# Patient Record
Sex: FEMALE | Race: WHITE | NOT HISPANIC OR LATINO | Employment: FULL TIME | URBAN - NONMETROPOLITAN AREA
[De-identification: names, ages, dates, MRNs, and addresses within clinical notes are randomized per-mention and may not be internally consistent; named-entity substitution may affect disease eponyms.]

---

## 2023-05-17 ENCOUNTER — OFFICE VISIT (OUTPATIENT)
Dept: PRIMARY CARE | Facility: CLINIC | Age: 28
End: 2023-05-17
Payer: COMMERCIAL

## 2023-05-17 VITALS
TEMPERATURE: 98.6 F | HEIGHT: 63 IN | DIASTOLIC BLOOD PRESSURE: 80 MMHG | OXYGEN SATURATION: 98 % | HEART RATE: 115 BPM | SYSTOLIC BLOOD PRESSURE: 126 MMHG | WEIGHT: 274.2 LBS | BODY MASS INDEX: 48.58 KG/M2

## 2023-05-17 DIAGNOSIS — E22.1 HYPERPROLACTINEMIA (MULTI): ICD-10-CM

## 2023-05-17 DIAGNOSIS — K21.9 GASTROESOPHAGEAL REFLUX DISEASE, UNSPECIFIED WHETHER ESOPHAGITIS PRESENT: ICD-10-CM

## 2023-05-17 DIAGNOSIS — J30.89 ENVIRONMENTAL AND SEASONAL ALLERGIES: ICD-10-CM

## 2023-05-17 DIAGNOSIS — E03.9 HYPOTHYROIDISM, UNSPECIFIED TYPE: ICD-10-CM

## 2023-05-17 DIAGNOSIS — F41.1 GENERALIZED ANXIETY DISORDER: ICD-10-CM

## 2023-05-17 DIAGNOSIS — E78.5 BORDERLINE HYPERLIPIDEMIA: ICD-10-CM

## 2023-05-17 DIAGNOSIS — F33.9 RECURRENT MAJOR DEPRESSIVE DISORDER, REMISSION STATUS UNSPECIFIED (CMS-HCC): ICD-10-CM

## 2023-05-17 DIAGNOSIS — R53.81 CHRONIC FATIGUE AND MALAISE: Primary | ICD-10-CM

## 2023-05-17 DIAGNOSIS — R53.82 CHRONIC FATIGUE AND MALAISE: Primary | ICD-10-CM

## 2023-05-17 DIAGNOSIS — E28.2 PCOS (POLYCYSTIC OVARIAN SYNDROME): ICD-10-CM

## 2023-05-17 DIAGNOSIS — D51.0 PERNICIOUS ANEMIA: ICD-10-CM

## 2023-05-17 PROBLEM — E04.9 GOITER: Status: ACTIVE | Noted: 2023-05-17

## 2023-05-17 PROBLEM — R71.8 ELEVATED RED BLOOD CELL COUNT: Status: RESOLVED | Noted: 2023-05-17 | Resolved: 2023-05-17

## 2023-05-17 PROBLEM — A74.9 CHLAMYDIAL INFECTION: Status: RESOLVED | Noted: 2019-02-18 | Resolved: 2023-05-17

## 2023-05-17 PROBLEM — E66.01 OBESITY, CLASS III, BMI 40-49.9 (MORBID OBESITY) (MULTI): Status: ACTIVE | Noted: 2019-04-08

## 2023-05-17 PROBLEM — F32.A ANXIETY AND DEPRESSION: Status: ACTIVE | Noted: 2023-05-17

## 2023-05-17 PROBLEM — R06.83 SNORING: Status: RESOLVED | Noted: 2023-05-17 | Resolved: 2023-05-17

## 2023-05-17 PROBLEM — E55.9 VITAMIN D DEFICIENCY: Status: ACTIVE | Noted: 2023-05-17

## 2023-05-17 PROBLEM — N91.5 OLIGOMENORRHEA: Status: RESOLVED | Noted: 2019-02-15 | Resolved: 2023-05-17

## 2023-05-17 PROBLEM — A59.9 TRICHOMONAS INFECTION: Status: RESOLVED | Noted: 2023-05-17 | Resolved: 2023-05-17

## 2023-05-17 PROBLEM — E66.813 OBESITY, CLASS III, BMI 40-49.9 (MORBID OBESITY): Status: ACTIVE | Noted: 2019-04-08

## 2023-05-17 PROBLEM — R06.83 SNORING: Status: ACTIVE | Noted: 2023-05-17

## 2023-05-17 PROBLEM — G47.00 INSOMNIA: Status: ACTIVE | Noted: 2023-05-17

## 2023-05-17 PROBLEM — F41.9 ANXIETY AND DEPRESSION: Status: ACTIVE | Noted: 2023-05-17

## 2023-05-17 PROBLEM — N20.0 KIDNEY STONES: Status: RESOLVED | Noted: 2023-05-17 | Resolved: 2023-05-17

## 2023-05-17 PROCEDURE — 1036F TOBACCO NON-USER: CPT | Performed by: PHYSICIAN ASSISTANT

## 2023-05-17 PROCEDURE — 99214 OFFICE O/P EST MOD 30 MIN: CPT | Performed by: PHYSICIAN ASSISTANT

## 2023-05-17 RX ORDER — SERTRALINE HYDROCHLORIDE 25 MG/1
25 TABLET, FILM COATED ORAL DAILY
Qty: 90 TABLET | Refills: 0 | Status: SHIPPED | OUTPATIENT
Start: 2023-05-17 | End: 2023-06-20 | Stop reason: SDUPTHER

## 2023-05-17 RX ORDER — METFORMIN HYDROCHLORIDE 500 MG/1
500 TABLET ORAL
Qty: 180 TABLET | Refills: 3 | Status: SHIPPED | OUTPATIENT
Start: 2023-05-17 | End: 2023-06-20 | Stop reason: SDUPTHER

## 2023-05-17 RX ORDER — CETIRIZINE HYDROCHLORIDE 10 MG/1
10 TABLET ORAL DAILY
Qty: 90 TABLET | Refills: 3 | Status: SHIPPED | OUTPATIENT
Start: 2023-05-17 | End: 2024-05-16

## 2023-05-17 RX ORDER — OMEPRAZOLE 40 MG/1
40 CAPSULE, DELAYED RELEASE ORAL DAILY
Qty: 90 CAPSULE | Refills: 3 | Status: SHIPPED | OUTPATIENT
Start: 2023-05-17 | End: 2023-12-20 | Stop reason: WASHOUT

## 2023-05-17 ASSESSMENT — PATIENT HEALTH QUESTIONNAIRE - PHQ9
2. FEELING DOWN, DEPRESSED OR HOPELESS: NOT AT ALL
SUM OF ALL RESPONSES TO PHQ9 QUESTIONS 1 AND 2: 0
1. LITTLE INTEREST OR PLEASURE IN DOING THINGS: NOT AT ALL

## 2023-05-17 NOTE — PROGRESS NOTES
Subjective   Patient ID: Heydi Tineo is a 27 y.o. female who presents for New Patient Visit (Establish care,. Would like updated blood work. ).    HPI Heydi Tineo is a 27 y.o. year old female patient with presenting to clinic with concern for   Chief Complaint   Patient presents with    New Patient Visit     Establish care,. Would like updated blood work.        Sister with Lupus. Chronic malaise and fatigue.   PCOS. Difficulty with weight loss.   GERD  Sees Gyn marvintamaylin        Past Medical History:   Diagnosis Date    Other specified health status     Known health problems: none      Past Surgical History:   Procedure Laterality Date    OTHER SURGICAL HISTORY  01/28/2019    Tonsillectomy with adenoidectomy    OTHER SURGICAL HISTORY  01/28/2019    Ear pressure equalization tube insertion      No family history on file.   Social History     Tobacco Use    Smoking status: Never    Smokeless tobacco: Never   Vaping Use    Vaping status: Not on file   Substance Use Topics    Alcohol use: Never            Current Outpatient Medications:     omeprazole (PriLOSEC) 40 mg DR capsule, Take 1 capsule by mouth once daily, Disp: 30 capsule, Rfl: 0        Review of Systems  Review of Systems:   Constitutional: Denies fever  HEENT: Denies ST, earache  CVS: Denies Chest pain  Pulmonary: Denies wheezing, SOB  GI: Denies N/V  : Denies dysuria  Musculoskeletal:  Denies myalgia  Neuro: Denies focal weakness or numbness.  Skin: Denies Rashes.  *Review of Systems is negative unless otherwise mentioned in HPI or ROS above.      Objective   /80   Pulse (!) 115   Temp 37 °C (98.6 °F)   Wt 124 kg (274 lb 3.2 oz)   SpO2 98%   BMI 48.57 kg/m²     Physical Exam  Physical exam:  /80   Pulse (!) 115   Temp 37 °C (98.6 °F)   Wt 124 kg (274 lb 3.2 oz)   SpO2 98%   BMI 48.57 kg/m²  reviewed Body mass index is 48.57 kg/m².   Constitutional: NAD. Afebrile. Resting comfortably.  ENT: Nasal mucosa and oropharynx: moist oral  mucosa. Posterior oropharynx nonerythematous. No posterior pharyngeal streaking.  TM: Bilat TM nonerythematous, pearly grey, landmarks intact. EAC wnl bilat.  Eyes: PERRLA. EOM intact.   Lymph: No anterior cervical chain or submandibular lymphadenopathy. No sentinel lymph nodes.  Cardiac: Regular rate at 90 & reg rhythm. No murmur, gallops, or rubs.  Pulmonary: Lungs clear to auscultation bilaterally with good aeration. No wheezes, rhonchi, or rales.   GI: Soft, Nontender, nondistended. No guarding. Normal BS x4.  : No suprapubic tenderness. No CVA tenderness to percussion.   Musculoskeletal: No peripheral edema.   Skin: No evidence of trauma. No rashes  Neuro: No focal neuro deficits. Normal gait without assistive devices.  Psych: Intact judgement and insight.    Assessment/Plan   Problem List Items Addressed This Visit       Hyperprolactinemia (CMS/[facility])    Relevant Orders    Referral to Endocrinology    Prolactin level    PCOS (polycystic ovarian syndrome)    Relevant Medications    metFORMIN (Glucophage) 500 mg tablet    Other Relevant Orders    Hemoglobin A1C    Referral to Endocrinology    Pernicious anemia    Relevant Orders    CBC    Vitamin B12    Folate     Other Visit Diagnoses       Chronic fatigue and malaise    -  Primary    Relevant Orders    LISA + MAURI Panel    Citrulline Antibody, IgG    C-Reactive Protein    Generalized anxiety disorder        Relevant Medications    sertraline (Zoloft) 25 mg tablet    Other Relevant Orders    CBC    Comprehensive Metabolic Panel    Recurrent major depressive disorder, remission status unspecified (CMS/[facility])        Relevant Medications    sertraline (Zoloft) 25 mg tablet    Other Relevant Orders    CBC    Comprehensive Metabolic Panel    Hypothyroidism, unspecified type        Relevant Orders    US thyroid    TSH    T4, free    T3, free    Referral to Endocrinology    Borderline hyperlipidemia        Relevant Orders    Lipid Panel    TSH    Gastroesophageal reflux  disease, unspecified whether esophagitis present        Relevant Medications    omeprazole (PriLOSEC) 40 mg DR capsule    Environmental and seasonal allergies        Relevant Medications    cetirizine (ZyrTEC) 10 mg tablet

## 2023-05-19 ENCOUNTER — LAB (OUTPATIENT)
Dept: LAB | Facility: LAB | Age: 28
End: 2023-05-19
Payer: COMMERCIAL

## 2023-05-19 DIAGNOSIS — R53.82 CHRONIC FATIGUE AND MALAISE: ICD-10-CM

## 2023-05-19 DIAGNOSIS — E03.9 HYPOTHYROIDISM, UNSPECIFIED TYPE: ICD-10-CM

## 2023-05-19 DIAGNOSIS — F33.9 RECURRENT MAJOR DEPRESSIVE DISORDER, REMISSION STATUS UNSPECIFIED (CMS-HCC): ICD-10-CM

## 2023-05-19 DIAGNOSIS — R53.81 CHRONIC FATIGUE AND MALAISE: ICD-10-CM

## 2023-05-19 DIAGNOSIS — E28.2 PCOS (POLYCYSTIC OVARIAN SYNDROME): ICD-10-CM

## 2023-05-19 DIAGNOSIS — D51.0 PERNICIOUS ANEMIA: ICD-10-CM

## 2023-05-19 DIAGNOSIS — F41.1 GENERALIZED ANXIETY DISORDER: ICD-10-CM

## 2023-05-19 DIAGNOSIS — E78.5 BORDERLINE HYPERLIPIDEMIA: ICD-10-CM

## 2023-05-19 LAB
ALANINE AMINOTRANSFERASE (SGPT) (U/L) IN SER/PLAS: 17 U/L (ref 7–45)
ALBUMIN (G/DL) IN SER/PLAS: 4.4 G/DL (ref 3.4–5)
ALKALINE PHOSPHATASE (U/L) IN SER/PLAS: 72 U/L (ref 33–110)
ANION GAP IN SER/PLAS: 16 MMOL/L (ref 10–20)
ANTI-CENTROMERE: <0.2 AI
ANTI-CHROMATIN: <0.2 AI
ANTI-DNA (DS): 2 IU/ML
ANTI-JO-1 IGG: <0.2 AI
ANTI-RIBOSOMAL P: <0.2 AI
ANTI-RNP: <0.2 AI
ANTI-SCL-70: <0.2 AI
ANTI-SM/RNP: <0.2 AI
ANTI-SM: <0.2 AI
ANTI-SSA: <0.2 AI
ANTI-SSB: <0.2 AI
ASPARTATE AMINOTRANSFERASE (SGOT) (U/L) IN SER/PLAS: 20 U/L (ref 9–39)
BILIRUBIN TOTAL (MG/DL) IN SER/PLAS: 0.6 MG/DL (ref 0–1.2)
CALCIUM (MG/DL) IN SER/PLAS: 10 MG/DL (ref 8.6–10.3)
CARBON DIOXIDE, TOTAL (MMOL/L) IN SER/PLAS: 26 MMOL/L (ref 21–32)
CHLORIDE (MMOL/L) IN SER/PLAS: 101 MMOL/L (ref 98–107)
CHOLESTEROL (MG/DL) IN SER/PLAS: 166 MG/DL (ref 0–199)
CHOLESTEROL IN HDL (MG/DL) IN SER/PLAS: 49.4 MG/DL
CHOLESTEROL/HDL RATIO: 3.4
CREATININE (MG/DL) IN SER/PLAS: 0.88 MG/DL (ref 0.5–1.05)
ERYTHROCYTE DISTRIBUTION WIDTH (RATIO) BY AUTOMATED COUNT: 12.6 % (ref 11.5–14.5)
ERYTHROCYTE MEAN CORPUSCULAR HEMOGLOBIN CONCENTRATION (G/DL) BY AUTOMATED: 31.4 G/DL (ref 32–36)
ERYTHROCYTE MEAN CORPUSCULAR VOLUME (FL) BY AUTOMATED COUNT: 93 FL (ref 80–100)
ERYTHROCYTES (10*6/UL) IN BLOOD BY AUTOMATED COUNT: 5.12 X10E12/L (ref 4–5.2)
ESTIMATED AVERAGE GLUCOSE FOR HBA1C: 100 MG/DL
GFR FEMALE: >90 ML/MIN/1.73M2
GLUCOSE (MG/DL) IN SER/PLAS: 85 MG/DL (ref 74–99)
HEMATOCRIT (%) IN BLOOD BY AUTOMATED COUNT: 47.4 % (ref 36–46)
HEMOGLOBIN (G/DL) IN BLOOD: 14.9 G/DL (ref 12–16)
HEMOGLOBIN A1C/HEMOGLOBIN TOTAL IN BLOOD: 5.1 %
LDL: 95 MG/DL (ref 0–119)
LEUKOCYTES (10*3/UL) IN BLOOD BY AUTOMATED COUNT: 14.1 X10E9/L (ref 4.4–11.3)
PLATELETS (10*3/UL) IN BLOOD AUTOMATED COUNT: 582 X10E9/L (ref 150–450)
POTASSIUM (MMOL/L) IN SER/PLAS: 4.6 MMOL/L (ref 3.5–5.3)
PROTEIN TOTAL: 7.4 G/DL (ref 6.4–8.2)
SODIUM (MMOL/L) IN SER/PLAS: 138 MMOL/L (ref 136–145)
THYROTROPIN (MIU/L) IN SER/PLAS BY DETECTION LIMIT <= 0.05 MIU/L: 1.29 MIU/L (ref 0.44–3.98)
THYROXINE (T4) FREE (NG/DL) IN SER/PLAS: 1.33 NG/DL (ref 0.61–1.12)
TRIGLYCERIDE (MG/DL) IN SER/PLAS: 106 MG/DL (ref 0–149)
TRIIODOTHYRONINE (T3) FREE (PG/ML) IN SER/PLAS: 4.3 PG/ML (ref 2.3–4.2)
UREA NITROGEN (MG/DL) IN SER/PLAS: 11 MG/DL (ref 6–23)
VLDL: 21 MG/DL (ref 0–40)

## 2023-05-19 PROCEDURE — 80061 LIPID PANEL: CPT

## 2023-05-19 PROCEDURE — 82607 VITAMIN B-12: CPT

## 2023-05-19 PROCEDURE — 84481 FREE ASSAY (FT-3): CPT

## 2023-05-19 PROCEDURE — 83036 HEMOGLOBIN GLYCOSYLATED A1C: CPT

## 2023-05-19 PROCEDURE — 82746 ASSAY OF FOLIC ACID SERUM: CPT

## 2023-05-19 PROCEDURE — 36415 COLL VENOUS BLD VENIPUNCTURE: CPT

## 2023-05-19 PROCEDURE — 84443 ASSAY THYROID STIM HORMONE: CPT

## 2023-05-19 PROCEDURE — 84439 ASSAY OF FREE THYROXINE: CPT

## 2023-05-19 PROCEDURE — 86038 ANTINUCLEAR ANTIBODIES: CPT

## 2023-05-19 PROCEDURE — 80053 COMPREHEN METABOLIC PANEL: CPT

## 2023-05-19 PROCEDURE — 86235 NUCLEAR ANTIGEN ANTIBODY: CPT

## 2023-05-19 PROCEDURE — 85027 COMPLETE CBC AUTOMATED: CPT

## 2023-05-19 PROCEDURE — 86225 DNA ANTIBODY NATIVE: CPT

## 2023-05-20 LAB
COBALAMIN (VITAMIN B12) (PG/ML) IN SER/PLAS: 468 PG/ML (ref 211–911)
FOLATE (NG/ML) IN SER/PLAS: 14.7 NG/ML

## 2023-05-22 ENCOUNTER — APPOINTMENT (OUTPATIENT)
Dept: PRIMARY CARE | Facility: CLINIC | Age: 28
End: 2023-05-22
Payer: COMMERCIAL

## 2023-05-23 LAB — ANTI-NUCLEAR ANTIBODY (ANA): NEGATIVE

## 2023-06-19 NOTE — PROGRESS NOTES
Subjective     HPI   Heydi Tineo is a 27 y.o. year old female patient with presenting to clinic with concern for   Chief Complaint   Patient presents with    Follow-up     1 month       Started on zoloft last visit for depression.  Started on metformin for PCOS. A1c was 5.1  Started on prilosec for GERD.  Started on zyrtec for allergic rhinitis.  Anemia Hx- H&H, B12, and folate wnl. Mild thrombocytosis chronically noted.   Reviewed thyroid. Multinodal goiter unchanged on US. T3 and T4 both elevated- needs to see endocrinology- long waits for index appointments.  Has apt at the end of July.  Hx elevated CRP 3.32, sister with Hx lupus. Heydi has malaise and fatigue chroniclly. LISA negative MAURI reflex was unremarkable.  Prolactin level, citrulline, CRP not drawn.    Addressed last visit:    Hyperprolactinemia (CMS/HCC)    Relevant Orders    Referral to Endocrinology    Prolactin level    PCOS (polycystic ovarian syndrome)    Relevant Medications    metFORMIN (Glucophage) 500 mg tablet    Other Relevant Orders    Hemoglobin A1C    Referral to Endocrinology    Pernicious anemia    Relevant Orders    CBC    Vitamin B12    Folate     Other Visit Diagnoses       Chronic fatigue and malaise    -  Primary    Relevant Orders    LISA + MAURI Panel    Citrulline Antibody, IgG    C-Reactive Protein    Generalized anxiety disorder        Relevant Medications    sertraline (Zoloft) 25 mg tablet    Other Relevant Orders    CBC    Comprehensive Metabolic Panel    Recurrent major depressive disorder, remission status unspecified (CMS/HCC)        Relevant Medications    sertraline (Zoloft) 25 mg tablet    Other Relevant Orders    CBC    Comprehensive Metabolic Panel    Hypothyroidism, unspecified type    T3 and T4 abnormal- needs endocrinology    Relevant Orders    US thyroid               Multinodular goiter.  Up to 4 total nodules described, which includes  largest and/or most clinically significant based on morphology.) It  is noted  that some spongiform and/or cystic nodules may not be  specifically described and are TR category 1 (benign).     Very tiny cystic or spongiform nodules within the bilateral thyroid  measuring up to 2 mm on the right and 2 mm on the left. No suspicious  nodules are seen.       TSH    T4, free    T3, free    Referral to Endocrinology    Borderline hyperlipidemia        Relevant Orders    Lipid Panel    TSH    Gastroesophageal reflux disease, unspecified whether esophagitis present        Relevant Medications    omeprazole (PriLOSEC) 40 mg DR capsule    Environmental and seasonal allergies        Relevant Medications    cetirizine (ZyrTEC) 10 mg tablet        Prolactin level, citrulline, CRP not drawn.    Patient Active Problem List   Diagnosis    Anxiety and depression    Chronic fatigue    Dyslipidemia    GERD without esophagitis    Hyperprolactinemia (CMS/HCC)    Obesity, Class III, BMI 40-49.9 (morbid obesity) (CMS/HCC)    PCOS (polycystic ovarian syndrome)    Pernicious anemia    Vitamin D deficiency    Insomnia    Goiter       Past Medical History:   Diagnosis Date    Other specified health status     Known health problems: none      Past Surgical History:   Procedure Laterality Date    OTHER SURGICAL HISTORY  01/28/2019    Tonsillectomy with adenoidectomy    OTHER SURGICAL HISTORY  01/28/2019    Ear pressure equalization tube insertion      No family history on file.   Social History     Tobacco Use    Smoking status: Never    Smokeless tobacco: Never   Substance Use Topics    Alcohol use: Never        Current Outpatient Medications:     cetirizine (ZyrTEC) 10 mg tablet, Take 1 tablet (10 mg) by mouth once daily., Disp: 90 tablet, Rfl: 3    metFORMIN (Glucophage) 500 mg tablet, Take 1 tablet (500 mg) by mouth 2 times a day with meals., Disp: 180 tablet, Rfl: 3    omeprazole (PriLOSEC) 40 mg DR capsule, Take 1 capsule (40 mg) by mouth once daily. Do not crush or chew., Disp: 90 capsule, Rfl: 3    sertraline  (Zoloft) 25 mg tablet, Take 1 tablet (25 mg) by mouth once daily., Disp: 90 tablet, Rfl: 0     Review of Systems  Review of Systems:   Constitutional: Denies fever  HEENT: Denies ST, earache  CVS: Denies Chest pain  Pulmonary: Denies wheezing, SOB  GI: Denies N/V  : Denies dysuria  Musculoskeletal:  Denies myalgia  Neuro: Denies focal weakness or numbness.  Skin: Denies Rashes.  *Review of Systems is negative unless otherwise mentioned in HPI or ROS above.    Objective   /70   Pulse (!) 115   Temp 37 °C (98.6 °F)   Wt 124 kg (273 lb)   SpO2 95%   BMI 48.36 kg/m²     Physical Exam  Physical exam:  /70   Pulse (!) 115   Temp 37 °C (98.6 °F)   Wt 124 kg (273 lb)   SpO2 95%   BMI 48.36 kg/m²  reviewed   Body mass index is 48.36 kg/m².   Constitutional: NAD.  Resting comfortably.  Head: Atraumatic, normocephalic.  EENT: deferred d/t masking  Cardiac: Regular rate & rhythm.   Pulmonary: Lungs clear bilat  GI: Soft, Nontender, nondistended.   Musculoskeletal: No peripheral edema.   Skin: No evidence of trauma. No rashes  Psych: Intact judgement and insight.    .Assessment/Plan   Problem List Items Addressed This Visit       PCOS (polycystic ovarian syndrome)    Relevant Medications    metFORMIN (Glucophage) 500 mg tablet     Other Visit Diagnoses       Generalized anxiety disorder        Relevant Medications    sertraline (Zoloft) 25 mg tablet    Recurrent major depressive disorder, remission status unspecified (CMS/[facility])        Relevant Medications    sertraline (Zoloft) 25 mg tablet

## 2023-06-20 ENCOUNTER — OFFICE VISIT (OUTPATIENT)
Dept: PRIMARY CARE | Facility: CLINIC | Age: 28
End: 2023-06-20
Payer: COMMERCIAL

## 2023-06-20 VITALS
WEIGHT: 273 LBS | OXYGEN SATURATION: 95 % | DIASTOLIC BLOOD PRESSURE: 70 MMHG | TEMPERATURE: 98.6 F | BODY MASS INDEX: 48.36 KG/M2 | SYSTOLIC BLOOD PRESSURE: 112 MMHG | HEART RATE: 115 BPM

## 2023-06-20 DIAGNOSIS — E28.2 PCOS (POLYCYSTIC OVARIAN SYNDROME): ICD-10-CM

## 2023-06-20 DIAGNOSIS — B37.2 CUTANEOUS CANDIDIASIS: ICD-10-CM

## 2023-06-20 DIAGNOSIS — F33.9 RECURRENT MAJOR DEPRESSIVE DISORDER, REMISSION STATUS UNSPECIFIED (CMS-HCC): Primary | ICD-10-CM

## 2023-06-20 DIAGNOSIS — F41.1 GENERALIZED ANXIETY DISORDER: ICD-10-CM

## 2023-06-20 PROCEDURE — 99214 OFFICE O/P EST MOD 30 MIN: CPT | Performed by: PHYSICIAN ASSISTANT

## 2023-06-20 PROCEDURE — 1036F TOBACCO NON-USER: CPT | Performed by: PHYSICIAN ASSISTANT

## 2023-06-20 RX ORDER — METFORMIN HYDROCHLORIDE 500 MG/1
500 TABLET ORAL
Qty: 180 TABLET | Refills: 3 | Status: SHIPPED | OUTPATIENT
Start: 2023-06-20 | End: 2023-12-20 | Stop reason: ALTCHOICE

## 2023-06-20 RX ORDER — SERTRALINE HYDROCHLORIDE 25 MG/1
25 TABLET, FILM COATED ORAL DAILY
Qty: 90 TABLET | Refills: 2 | Status: SHIPPED | OUTPATIENT
Start: 2023-06-20 | End: 2023-10-30

## 2023-06-20 RX ORDER — NYSTATIN 100000 [USP'U]/G
POWDER TOPICAL 2 TIMES DAILY
Qty: 30 G | Refills: 3 | Status: SHIPPED | OUTPATIENT
Start: 2023-06-20 | End: 2023-12-20 | Stop reason: WASHOUT

## 2023-06-20 ASSESSMENT — PATIENT HEALTH QUESTIONNAIRE - PHQ9
1. LITTLE INTEREST OR PLEASURE IN DOING THINGS: NOT AT ALL
SUM OF ALL RESPONSES TO PHQ9 QUESTIONS 1 AND 2: 0
2. FEELING DOWN, DEPRESSED OR HOPELESS: NOT AT ALL

## 2023-07-17 ENCOUNTER — LAB (OUTPATIENT)
Dept: LAB | Facility: LAB | Age: 28
End: 2023-07-17
Payer: COMMERCIAL

## 2023-07-17 DIAGNOSIS — R53.82 CHRONIC FATIGUE AND MALAISE: ICD-10-CM

## 2023-07-17 DIAGNOSIS — E22.1 HYPERPROLACTINEMIA (MULTI): ICD-10-CM

## 2023-07-17 DIAGNOSIS — R53.81 CHRONIC FATIGUE AND MALAISE: ICD-10-CM

## 2023-07-17 PROCEDURE — 86140 C-REACTIVE PROTEIN: CPT

## 2023-07-17 PROCEDURE — 84146 ASSAY OF PROLACTIN: CPT

## 2023-07-17 PROCEDURE — 36415 COLL VENOUS BLD VENIPUNCTURE: CPT

## 2023-07-17 PROCEDURE — 86200 CCP ANTIBODY: CPT

## 2023-07-18 LAB
C REACTIVE PROTEIN (MG/L) IN SER/PLAS: 1.37 MG/DL
CITRULLINE ANTIBODY, IGG: <1 U/ML
PROLACTIN (UG/L) IN SER/PLAS: 16.6 UG/L (ref 3–20)

## 2023-07-18 NOTE — RESULT ENCOUNTER NOTE
Prolactin within normal range. CRP is down from 2 years ago, but still a bit elevated showing inflammation.

## 2023-09-13 LAB
THYROTROPIN (MIU/L) IN SER/PLAS BY DETECTION LIMIT <= 0.05 MIU/L: 1.57 MIU/L (ref 0.44–3.98)
THYROXINE (T4) FREE (NG/DL) IN SER/PLAS: 0.95 NG/DL (ref 0.61–1.12)

## 2023-09-14 LAB
CORTISOL (UG/DL) IN SERUM - AM: 17 UG/DL (ref 5–20)
DEHYDROEPIANDROSTERONE SULFATE (DHEA-S) (UG/DL) IN SER/: 217 UG/DL (ref 65–395)
THYROPEROXIDASE AB (IU/ML) IN SER/PLAS: <28 IU/ML
TRIIODOTHYRONINE (T3) (NG/DL) IN SER/PLAS: 165 NG/DL (ref 60–200)

## 2023-09-16 LAB — ADRENOCORTICOTROPIC HORMONE: 22.5 PG/ML (ref 7.2–63.3)

## 2023-09-18 LAB — 17-HYDROXYPROGESTERONE (REFLAB): 25.73 NG/DL

## 2023-09-19 LAB
CORTISOL, SALIVA: 0.1 UG/DL
TESTOSTERONE FREE (CHAN): 3.7 PG/ML (ref 0.1–6.4)
TESTOSTERONE,TOTAL,LC-MS/MS: 35 NG/DL (ref 2–45)

## 2023-09-20 DIAGNOSIS — H05.89 ORBITAL MASS: Primary | ICD-10-CM

## 2023-10-13 ENCOUNTER — HOSPITAL ENCOUNTER (OUTPATIENT)
Dept: RADIOLOGY | Facility: HOSPITAL | Age: 28
Discharge: HOME | End: 2023-10-13
Payer: COMMERCIAL

## 2023-10-13 DIAGNOSIS — H05.89 ORBITAL MASS: ICD-10-CM

## 2023-10-13 PROCEDURE — 70543 MRI ORBT/FAC/NCK W/O &W/DYE: CPT | Performed by: RADIOLOGY

## 2023-10-13 PROCEDURE — 70543 MRI ORBT/FAC/NCK W/O &W/DYE: CPT

## 2023-10-13 PROCEDURE — 2550000001 HC RX 255 CONTRASTS: Performed by: PHYSICIAN ASSISTANT

## 2023-10-13 PROCEDURE — A9575 INJ GADOTERATE MEGLUMI 0.1ML: HCPCS | Performed by: PHYSICIAN ASSISTANT

## 2023-10-13 RX ORDER — GADOTERATE MEGLUMINE 376.9 MG/ML
26 INJECTION INTRAVENOUS
Status: COMPLETED | OUTPATIENT
Start: 2023-10-13 | End: 2023-10-13

## 2023-10-13 RX ADMIN — GADOTERATE MEGLUMINE 26 ML: 376.9 INJECTION INTRAVENOUS at 12:53

## 2023-10-16 DIAGNOSIS — D32.9 MENINGIOMA (MULTI): Primary | ICD-10-CM

## 2023-10-20 ENCOUNTER — APPOINTMENT (OUTPATIENT)
Dept: NEUROSURGERY | Facility: HOSPITAL | Age: 28
End: 2023-10-20
Payer: COMMERCIAL

## 2023-10-24 ENCOUNTER — OFFICE VISIT (OUTPATIENT)
Dept: NEUROSURGERY | Facility: HOSPITAL | Age: 28
End: 2023-10-24
Payer: COMMERCIAL

## 2023-10-24 VITALS
DIASTOLIC BLOOD PRESSURE: 81 MMHG | SYSTOLIC BLOOD PRESSURE: 124 MMHG | BODY MASS INDEX: 46.88 KG/M2 | WEIGHT: 264.55 LBS | HEART RATE: 108 BPM | HEIGHT: 63 IN

## 2023-10-24 DIAGNOSIS — D32.9 MENINGIOMA (MULTI): ICD-10-CM

## 2023-10-24 DIAGNOSIS — H05.89 ORBITAL LESION: Primary | ICD-10-CM

## 2023-10-24 PROCEDURE — 99203 OFFICE O/P NEW LOW 30 MIN: CPT | Performed by: NURSE PRACTITIONER

## 2023-10-24 PROCEDURE — 99213 OFFICE O/P EST LOW 20 MIN: CPT | Performed by: NURSE PRACTITIONER

## 2023-10-24 PROCEDURE — 1036F TOBACCO NON-USER: CPT | Performed by: NURSE PRACTITIONER

## 2023-10-24 RX ORDER — NORETHINDRONE ACETATE AND ETHINYL ESTRADIOL 1MG-20(21)
1 KIT ORAL DAILY
COMMUNITY
End: 2023-12-20 | Stop reason: SDUPTHER
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
## 2023-10-30 DIAGNOSIS — F33.9 RECURRENT MAJOR DEPRESSIVE DISORDER, REMISSION STATUS UNSPECIFIED (CMS-HCC): ICD-10-CM

## 2023-10-30 DIAGNOSIS — F41.1 GENERALIZED ANXIETY DISORDER: Primary | ICD-10-CM

## 2023-10-30 RX ORDER — SERTRALINE HYDROCHLORIDE 50 MG/1
50 TABLET, FILM COATED ORAL DAILY
Qty: 90 TABLET | Refills: 0 | Status: SHIPPED | OUTPATIENT
Start: 2023-10-30 | End: 2024-01-25

## 2023-11-03 ENCOUNTER — OFFICE VISIT (OUTPATIENT)
Dept: OPHTHALMOLOGY | Facility: CLINIC | Age: 28
End: 2023-11-03
Payer: COMMERCIAL

## 2023-11-03 DIAGNOSIS — H05.89 ORBITAL MASS: Primary | ICD-10-CM

## 2023-11-03 PROCEDURE — 99205 OFFICE O/P NEW HI 60 MIN: CPT | Performed by: PSYCHIATRY & NEUROLOGY

## 2023-11-03 PROCEDURE — 92060 SENSORIMOTOR EXAMINATION: CPT | Performed by: PSYCHIATRY & NEUROLOGY

## 2023-11-03 PROCEDURE — 92083 EXTENDED VISUAL FIELD XM: CPT | Performed by: PSYCHIATRY & NEUROLOGY

## 2023-11-03 PROCEDURE — 92133 CPTRZD OPH DX IMG PST SGM ON: CPT | Performed by: PSYCHIATRY & NEUROLOGY

## 2023-11-03 ASSESSMENT — VISUAL ACUITY
OD_CC: 20/20-1
OS_CC: 20/20-1
METHOD: SNELLEN - LINEAR
CORRECTION_TYPE: GLASSES

## 2023-11-03 ASSESSMENT — CONF VISUAL FIELD
OS_INFERIOR_TEMPORAL_RESTRICTION: 0
OD_INFERIOR_TEMPORAL_RESTRICTION: 0
OD_SUPERIOR_TEMPORAL_RESTRICTION: 0
OS_SUPERIOR_NASAL_RESTRICTION: 0
OD_INFERIOR_NASAL_RESTRICTION: 0
OS_NORMAL: 1
OD_NORMAL: 1
OS_INFERIOR_NASAL_RESTRICTION: 0
OD_SUPERIOR_NASAL_RESTRICTION: 0
OS_SUPERIOR_TEMPORAL_RESTRICTION: 0

## 2023-11-03 ASSESSMENT — CUP TO DISC RATIO
OS_RATIO: 0.3
OD_RATIO: 0.3

## 2023-11-03 ASSESSMENT — ENCOUNTER SYMPTOMS
GASTROINTESTINAL NEGATIVE: 0
ALLERGIC/IMMUNOLOGIC NEGATIVE: 0
RESPIRATORY NEGATIVE: 0
CARDIOVASCULAR NEGATIVE: 0
PSYCHIATRIC NEGATIVE: 0
NEUROLOGICAL NEGATIVE: 0
MUSCULOSKELETAL NEGATIVE: 0
EYES NEGATIVE: 1
CONSTITUTIONAL NEGATIVE: 0
ENDOCRINE NEGATIVE: 0
HEMATOLOGIC/LYMPHATIC NEGATIVE: 0

## 2023-11-03 ASSESSMENT — SLIT LAMP EXAM - LIDS
COMMENTS: NORMAL
COMMENTS: NORMAL

## 2023-11-03 ASSESSMENT — TONOMETRY
IOP_METHOD: GOLDMANN APPLANATION
OS_IOP_MMHG: 12
OD_IOP_MMHG: 12

## 2023-11-03 ASSESSMENT — EXTERNAL EXAM - LEFT EYE: OS_EXAM: NORMAL

## 2023-11-03 ASSESSMENT — EXTERNAL EXAM - RIGHT EYE: OD_EXAM: NORMAL

## 2023-11-03 NOTE — PROGRESS NOTES
Assessment and Plan    11/3/2023 Anup OD 33 & OS 33 at base 94.    10/13/2023 MRI orbits with contrast, which I personally reviewed, shows an enhancing mass of the right superior orbit between the lateral and superior rectus muscles consistent with cavernous hemangioma.  09/17/2023 CT facial bones without contrast, which I personally reviewed, shows a mass of the right superior orbital wall.  10/24/2022 CT head without contrast, which I personally reviewed, shows no lesion.    11/3/2023 OCT RNFL OD 88 & OS 89.    11/3/2023 HVF 24-2 OD fovea 36, wnl MD -1.92 & OS fovea 33, general reduction MD -5.06.    This 28 year-old woman with a history of PCOS, obesity, hyperprolactinemia, DLD presents for evaluation of a right orbital mass.    Her eye examination is normal. I do not see evidence of misalignment, proptosis or optic neuropathy. The mass itself is likely a cavernous hemangioma versus far less likely another vascular or lymphatic abnormality and even less likely a neoplasm such as metastasis. While not evident on 10/24/2022 CT head without contrast, that non-dedicated study often misses orbital lesions and does not necessarily provide a baseline. We discussed options of observation with examinations and MRI scans versus biopsy or excision with intervention balanced against perioperative risks given good vision. She strongly favors excision.    Plan    Further consultation with neurosurgeon.  Consider MRI orbits with contrast including TWIST MRA in 6 months.  No consultation with orbital surgeon at this time after discussion of local and remote options.    Follow up in 4-6 months with stereo plates, HVF & OCT. (Dilated 11/3/2023)

## 2023-11-15 ENCOUNTER — MULTIDISCIPLINARY MEETING (OUTPATIENT)
Dept: NEUROSURGERY | Facility: HOSPITAL | Age: 28
End: 2023-11-15
Payer: COMMERCIAL

## 2023-11-17 NOTE — PROGRESS NOTES
SKULL BASE/ ENT CONFERENCE     Imaging discussed at skull base / ENT multidisciplinary conference with recommendations for repeat MRI orbit w/wo in 6 months.

## 2023-12-20 ENCOUNTER — TELEMEDICINE (OUTPATIENT)
Dept: PRIMARY CARE | Facility: CLINIC | Age: 28
End: 2023-12-20
Payer: COMMERCIAL

## 2023-12-20 DIAGNOSIS — Z76.0 MEDICATION REFILL: ICD-10-CM

## 2023-12-20 DIAGNOSIS — R25.3 EYELID TWITCH: Primary | ICD-10-CM

## 2023-12-20 PROCEDURE — 99214 OFFICE O/P EST MOD 30 MIN: CPT | Performed by: PHYSICIAN ASSISTANT

## 2023-12-20 RX ORDER — NORETHINDRONE ACETATE AND ETHINYL ESTRADIOL 1MG-20(21)
1 KIT ORAL DAILY
Qty: 28 TABLET | Refills: 0 | Status: SHIPPED | OUTPATIENT
Start: 2023-12-20 | End: 2024-01-17

## 2023-12-20 ASSESSMENT — ENCOUNTER SYMPTOMS
SORE THROAT: 1
HEADACHES: 1
DIARRHEA: 0
RHINORRHEA: 1
COUGH: 0
ABDOMINAL PAIN: 0
VOMITING: 0
NECK PAIN: 0

## 2023-12-20 NOTE — PROGRESS NOTES
"An interactive audio and video telecommunication system which permits real time communications between the patient (at the originating site) and provider (at the distant site) was utilized to provide this telehealth service.   Verbal consent was requested and obtained from Heydi Tineo on this date, 12/20/23 for a telehealth visit.   Answers submitted by the patient for this visit:  Ear Pain Questionnaire (Submitted on 12/20/2023)  Chief Complaint: Ear pain  Affected ear: both  Chronicity: recurrent  Onset: in the past 7 days  Progression since onset: gradually worsening  Frequency: every few hours  Fever: no fever  Pain - numeric: 5/10  abdominal pain: No  ear discharge: No  rash: No  cough: No  headaches: Yes  rhinorrhea: Yes  diarrhea: No  hearing loss: No  sore throat: Yes  neck pain: No  vomiting: No      Subjective    Heydi Tineo is a 28 y.o. year old female patient presenting for virtual visit   Chief Complaint   Patient presents with    Follow-up      Increased zoloft to 50mg daily and she is doing well.     Continue metformin? A1c 5.1 in May, on meds for PCOS. Not significantly different symptoms. Plans to stop medication. She is not feeling significantly different.     Eye twitching- has eye twitching on tumor side- cutting back on caffeine, unchanged sleep habits, and unchanged stress levels. Eye twitching on upper lid of meningioma side.      10/13/2023 MRI orbits with contrast- enhancing mass of the right superior orbit between the lateral and superior rectus muscles consistent with cavernous hemangioma  .  09/17/2023 CT facial bones without contrast - mass of the right superior orbital wall.    10/24/2022 CT head without contrast-no lesion.    Hx PCOS, obesity, hyperprolactinemia, DLD     11/3/23 Francois Khan MD PhD note:  Neuro-Ophthalmology  [\"Her eye examination is normal. I do not see evidence of misalignment, proptosis or optic neuropathy. The mass itself is likely a cavernous hemangioma " "versus far less likely another vascular or lymphatic abnormality and even less likely a neoplasm such as metastasis. While not evident on 10/24/2022 CT head without contrast, that non-dedicated study often misses orbital lesions and does not necessarily provide a baseline. We discussed options of observation with examinations and MRI scans versus biopsy or excision with intervention balanced against perioperative risks given good vision. She strongly favors excision.     Plan     Further consultation with neurosurgeon.  Consider MRI orbits with contrast including TWIST MRA in 6 months.  No consultation with orbital surgeon at this time after discussion of local and remote options.     Follow up in 4-6 months with stereo plates, HVF & OCT. (Dilated 11/3/2023)\"]           Patient Active Problem List   Diagnosis    Anxiety and depression    Chronic fatigue    Dyslipidemia    GERD without esophagitis    Hyperprolactinemia (CMS/HCC)    Obesity, Class III, BMI 40-49.9 (morbid obesity) (CMS/HCC)    PCOS (polycystic ovarian syndrome)    Pernicious anemia    Vitamin D deficiency    Insomnia    Goiter       Past Medical History:   Diagnosis Date    Other specified health status     Known health problems: none      Past Surgical History:   Procedure Laterality Date    OTHER SURGICAL HISTORY  01/28/2019    Tonsillectomy with adenoidectomy    OTHER SURGICAL HISTORY  01/28/2019    Ear pressure equalization tube insertion      Family History   Problem Relation Name Age of Onset    Deep vein thrombosis Father's Brother        Social History     Tobacco Use    Smoking status: Never    Smokeless tobacco: Never   Substance Use Topics    Alcohol use: Never        Current Outpatient Medications:     cetirizine (ZyrTEC) 10 mg tablet, Take 1 tablet (10 mg) by mouth once daily., Disp: 90 tablet, Rfl: 3    metFORMIN (Glucophage) 500 mg tablet, Take 1 tablet (500 mg) by mouth 2 times a day with meals., Disp: 180 tablet, Rfl: 3    " norethindrone-e.estradioL-iron (Junel FE 1/20) 1 mg-20 mcg (21)/75 mg (7) tablet, Take 1 tablet by mouth once daily., Disp: , Rfl:     sertraline (Zoloft) 50 mg tablet, Take 1 tablet (50 mg) by mouth once daily., Disp: 90 tablet, Rfl: 0     Review of Systems    Review of Systems:   Constitutional: Denies fever  HEENT: Denies ST, earache  CVS: Denies Chest pain  Pulmonary: Denies wheezing, SOB  GI: Denies N/V  : Denies dysuria  Musculoskeletal:  Denies myalgia  Neuro: Denies focal weakness or numbness.  Skin: Denies Rashes.  *Review of Systems is negative unless otherwise mentioned in HPI or ROS above.     Objective    VITALS  Pt does not have vitals available at time of visit.    Exam       Limited physical exam in virtual platform  Nontoxic. No acute distress.  Nonlabored breathing.    Assessment/Plan      Problem List Items Addressed This Visit    None       Problem List Items Addressed This Visit    None  Visit Diagnoses       Eyelid twitch    -  Primary    Relevant Orders    Basic metabolic panel    Medication refill        Relevant Medications    norethindrone-e.estradioL-iron (Junel FE 1/20) 1 mg-20 mcg (21)/75 mg (7) tablet               DISCHARGE    Please schedule a follow up visit     Any prescriptions were sent to the pharmacy, please make sure to pick them up and call if there are any issues or questions.     Thank you for trusting me to be a part of your healthcare.    Take care!     Valeria Mac PA-C  [facility]  [phone] [phone]     Please feel free to call the office, or return a message if you have any questions or concerns.

## 2024-01-25 DIAGNOSIS — F33.9 RECURRENT MAJOR DEPRESSIVE DISORDER, REMISSION STATUS UNSPECIFIED (CMS-HCC): ICD-10-CM

## 2024-01-25 DIAGNOSIS — F41.1 GENERALIZED ANXIETY DISORDER: ICD-10-CM

## 2024-01-25 RX ORDER — SERTRALINE HYDROCHLORIDE 50 MG/1
50 TABLET, FILM COATED ORAL DAILY
Qty: 90 TABLET | Refills: 3 | Status: SHIPPED | OUTPATIENT
Start: 2024-01-25 | End: 2025-01-24

## 2024-01-26 DIAGNOSIS — K21.9 GERD WITHOUT ESOPHAGITIS: ICD-10-CM

## 2024-01-26 RX ORDER — OMEPRAZOLE 40 MG/1
1 CAPSULE, DELAYED RELEASE ORAL DAILY
COMMUNITY
Start: 2022-08-05 | End: 2024-01-26 | Stop reason: SDUPTHER

## 2024-01-28 RX ORDER — OMEPRAZOLE 40 MG/1
40 CAPSULE, DELAYED RELEASE ORAL DAILY
Qty: 90 CAPSULE | Refills: 3 | Status: SHIPPED | OUTPATIENT
Start: 2024-01-28 | End: 2025-01-27

## 2024-02-18 DIAGNOSIS — D32.9 MENINGIOMA (MULTI): Primary | ICD-10-CM

## 2024-02-18 DIAGNOSIS — H05.89 ORBITAL LESION: ICD-10-CM

## 2024-03-08 ENCOUNTER — HOSPITAL ENCOUNTER (OUTPATIENT)
Dept: RADIOLOGY | Facility: HOSPITAL | Age: 29
Discharge: HOME | End: 2024-03-08
Payer: COMMERCIAL

## 2024-03-08 DIAGNOSIS — H05.89 ORBITAL LESION: ICD-10-CM

## 2024-03-08 DIAGNOSIS — D32.9 MENINGIOMA (MULTI): ICD-10-CM

## 2024-03-08 PROCEDURE — A9575 INJ GADOTERATE MEGLUMI 0.1ML: HCPCS | Performed by: NURSE PRACTITIONER

## 2024-03-08 PROCEDURE — 2550000001 HC RX 255 CONTRASTS: Performed by: NURSE PRACTITIONER

## 2024-03-08 PROCEDURE — 70543 MRI ORBT/FAC/NCK W/O &W/DYE: CPT

## 2024-03-08 RX ORDER — GADOTERATE MEGLUMINE 376.9 MG/ML
20 INJECTION INTRAVENOUS
Status: COMPLETED | OUTPATIENT
Start: 2024-03-08 | End: 2024-03-08

## 2024-03-08 RX ADMIN — GADOTERATE MEGLUMINE 20 ML: 376.9 INJECTION INTRAVENOUS at 11:34

## 2024-03-15 NOTE — RESULT ENCOUNTER NOTE
Imaging: MRI Orbit w/wo 03/08/24 with continued enhancing well-circumscribed lesions containing intraconal and extraconal components measuring 1.7 x 1.4 x 0.7cm previously 1.7 x 0.9 x 0.9 but upon review is not significantly changed and consistent with cavernous hemangioma.       Follow Up:  Continue following with ophthalmology and will plan for repeat MRI orbit w/wo in 1 year.

## 2024-05-30 ENCOUNTER — APPOINTMENT (OUTPATIENT)
Dept: OPHTHALMOLOGY | Facility: CLINIC | Age: 29
End: 2024-05-30
Payer: COMMERCIAL

## 2024-12-30 ENCOUNTER — APPOINTMENT (OUTPATIENT)
Dept: PRIMARY CARE | Facility: CLINIC | Age: 29
End: 2024-12-30
Payer: COMMERCIAL

## 2024-12-31 ENCOUNTER — APPOINTMENT (OUTPATIENT)
Dept: PRIMARY CARE | Facility: CLINIC | Age: 29
End: 2024-12-31
Payer: COMMERCIAL

## 2025-01-09 ENCOUNTER — OFFICE VISIT (OUTPATIENT)
Dept: PRIMARY CARE | Facility: CLINIC | Age: 30
End: 2025-01-09
Payer: COMMERCIAL

## 2025-01-09 VITALS
HEART RATE: 80 BPM | HEIGHT: 63 IN | SYSTOLIC BLOOD PRESSURE: 110 MMHG | DIASTOLIC BLOOD PRESSURE: 74 MMHG | BODY MASS INDEX: 43.59 KG/M2 | OXYGEN SATURATION: 97 % | TEMPERATURE: 97.7 F | WEIGHT: 246 LBS

## 2025-01-09 DIAGNOSIS — F33.9 RECURRENT MAJOR DEPRESSIVE DISORDER, REMISSION STATUS UNSPECIFIED (CMS-HCC): ICD-10-CM

## 2025-01-09 DIAGNOSIS — F41.1 GENERALIZED ANXIETY DISORDER: ICD-10-CM

## 2025-01-09 DIAGNOSIS — E55.9 VITAMIN D INSUFFICIENCY: ICD-10-CM

## 2025-01-09 DIAGNOSIS — K21.9 GERD WITHOUT ESOPHAGITIS: ICD-10-CM

## 2025-01-09 DIAGNOSIS — R63.4 UNINTENTIONAL WEIGHT LOSS: ICD-10-CM

## 2025-01-09 DIAGNOSIS — E78.5 BORDERLINE HYPERLIPIDEMIA: Primary | ICD-10-CM

## 2025-01-09 DIAGNOSIS — R05.9 COUGH, UNSPECIFIED TYPE: ICD-10-CM

## 2025-01-09 PROCEDURE — 1036F TOBACCO NON-USER: CPT | Performed by: PHYSICIAN ASSISTANT

## 2025-01-09 PROCEDURE — 99214 OFFICE O/P EST MOD 30 MIN: CPT | Performed by: PHYSICIAN ASSISTANT

## 2025-01-09 PROCEDURE — 3008F BODY MASS INDEX DOCD: CPT | Performed by: PHYSICIAN ASSISTANT

## 2025-01-09 RX ORDER — OMEPRAZOLE 40 MG/1
40 CAPSULE, DELAYED RELEASE ORAL DAILY
Qty: 90 CAPSULE | Refills: 3 | Status: SHIPPED | OUTPATIENT
Start: 2025-01-09 | End: 2026-01-09

## 2025-01-09 RX ORDER — SERTRALINE HYDROCHLORIDE 100 MG/1
100 TABLET, FILM COATED ORAL DAILY
Qty: 90 TABLET | Refills: 3 | Status: SHIPPED | OUTPATIENT
Start: 2025-01-09 | End: 2026-01-09

## 2025-01-09 ASSESSMENT — PATIENT HEALTH QUESTIONNAIRE - PHQ9
2. FEELING DOWN, DEPRESSED OR HOPELESS: NOT AT ALL
1. LITTLE INTEREST OR PLEASURE IN DOING THINGS: NOT AT ALL
SUM OF ALL RESPONSES TO PHQ9 QUESTIONS 1 AND 2: 0

## 2025-01-09 NOTE — PROGRESS NOTES
Subjective     HPI   Heydi Tineo is a 29 y.o. year old female patient with presenting to clinic with concern for   Chief Complaint   Patient presents with    Follow-up           Heydi presents to clinic for follow up, Last seen over 1 year ago.     unintentional weight loss- has been off OCP. Max weight was 290lb  Est 16% body weight loss int he past 7 months  Wt Readings from Last 5 Encounters:   01/09/25 112 kg (246 lb)   10/24/23 120 kg (264 lb 8.8 oz)   07/25/23 125 kg (275 lb 4 oz)   06/20/23 124 kg (273 lb)   05/17/23 124 kg (274 lb 3.2 oz)       Depression  -zoloft 50  increase to 100mg     GERD  -omeprazole 40     allergic rhinitis  -zyrtec     Anemia   Mild thrombocytosis chronically noted.      Multinodal goiter  unchanged on US. T3 and T4 both elevated     Family history autoimmune disorder, sister with lupus  Chronic fatigue and malaise  Hx elevated CRP 3.32,  LISA negative MAURI reflex was unremarkable.     Contraception & PCOS  Dr Ahuja GYN     Meningioma- annual follow up MRI & Neurourgery/ NeuroOphthamology  Follow Up: Continue following with ophthalmology and will plan for repeat MRI orbit w/wo in 1 year.     Patient Active Problem List   Diagnosis    Anxiety and depression    Chronic fatigue    Dyslipidemia    GERD without esophagitis    Hyperprolactinemia (Multi)    Obesity, Class III, BMI 40-49.9 (morbid obesity) (Multi)    PCOS (polycystic ovarian syndrome)    Pernicious anemia    Vitamin D deficiency    Insomnia    Goiter       Past Medical History:   Diagnosis Date    Other specified health status     Known health problems: none      Past Surgical History:   Procedure Laterality Date    OTHER SURGICAL HISTORY  01/28/2019    Tonsillectomy with adenoidectomy    OTHER SURGICAL HISTORY  01/28/2019    Ear pressure equalization tube insertion      Family History   Problem Relation Name Age of Onset    Deep vein thrombosis Father's Brother        Social History     Tobacco Use    Smoking status:  "Never    Smokeless tobacco: Never   Substance Use Topics    Alcohol use: Never        Current Outpatient Medications:     cetirizine (ZyrTEC) 10 mg tablet, Take 1 tablet (10 mg) by mouth once daily., Disp: 90 tablet, Rfl: 3    norethindrone-e.estradioL-iron (Junel FE 1/20) 1 mg-20 mcg (21)/75 mg (7) tablet, Take 1 tablet by mouth once daily for 28 days., Disp: 28 tablet, Rfl: 0    omeprazole (PriLOSEC) 40 mg DR capsule, Take 1 capsule (40 mg) by mouth once daily. Take 1 capsule (40 mg) by mouth once daily., Disp: 90 capsule, Rfl: 3    sertraline (Zoloft) 100 mg tablet, Take 1 tablet (100 mg) by mouth once daily., Disp: 90 tablet, Rfl: 3     Review of Systems  Constitutional: Denies fever  HEENT: Denies ST, earache  CVS: Denies Chest pain  Pulmonary: Denies wheezing, SOB  GI: Denies N/V  : Denies dysuria  Musculoskeletal:  Denies myalgia  Neuro: Denies focal weakness or numbness.  Skin: Denies Rashes.  *Review of Systems is negative unless otherwise mentioned in HPI or ROS above.    Objective   /74   Pulse 80   Temp 36.5 °C (97.7 °F)   Ht 1.6 m (5' 3\")   Wt 112 kg (246 lb)   SpO2 97%   BMI 43.58 kg/m²  reviewed Body mass index is 43.58 kg/m².     Physical Exam  Constitutional: NAD.  Resting comfortably.  Head: Atraumatic, normocephalic.  ENT: Moist oral mucosa. Nasal mucosa wnl.   Cardiac: Regular rate & rhythm.   Pulmonary: Lungs clear bilat  GI: Soft, Nontender, nondistended.   Musculoskeletal: No peripheral edema.   Skin: No evidence of trauma. No rashes  Psych: Intact judgement and insight.    .Assessment/Plan   Problem List Items Addressed This Visit             ICD-10-CM    GERD without esophagitis K21.9    Relevant Medications    omeprazole (PriLOSEC) 40 mg DR capsule     Other Visit Diagnoses         Codes    Borderline hyperlipidemia    -  Primary E78.5    Relevant Orders    CBC    Comprehensive Metabolic Panel    TSH with reflex to Free T4 if abnormal    Lipid Panel    Generalized anxiety " disorder     F41.1    Relevant Medications    sertraline (Zoloft) 100 mg tablet    Recurrent major depressive disorder, remission status unspecified (CMS-HCC)     F33.9    Relevant Medications    sertraline (Zoloft) 100 mg tablet    Vitamin D insufficiency     E55.9    Relevant Orders    Vitamin D 25-Hydroxy,Total (for eval of Vitamin D levels)    Unintentional weight loss     R63.4    Relevant Orders    Hemoglobin A1c    C-Reactive Protein    XR chest 2 views    Referral to Gastroenterology    Cough, unspecified type     R05.9    Relevant Orders    XR chest 2 views

## 2025-01-28 DIAGNOSIS — D72.829 LEUKOCYTOSIS, UNSPECIFIED TYPE: Primary | ICD-10-CM

## 2025-01-28 DIAGNOSIS — D75.839 THROMBOCYTOSIS: ICD-10-CM

## 2025-01-28 LAB
25(OH)D3+25(OH)D2 SERPL-MCNC: 29 NG/ML (ref 30–100)
ALBUMIN SERPL-MCNC: 4.5 G/DL (ref 3.6–5.1)
ALP SERPL-CCNC: 92 U/L (ref 31–125)
ALT SERPL-CCNC: 12 U/L (ref 6–29)
ANION GAP SERPL CALCULATED.4IONS-SCNC: 10 MMOL/L (CALC) (ref 7–17)
AST SERPL-CCNC: 14 U/L (ref 10–30)
BILIRUB SERPL-MCNC: 0.3 MG/DL (ref 0.2–1.2)
BUN SERPL-MCNC: 16 MG/DL (ref 7–25)
CALCIUM SERPL-MCNC: 9.7 MG/DL (ref 8.6–10.2)
CHLORIDE SERPL-SCNC: 102 MMOL/L (ref 98–110)
CHOLEST SERPL-MCNC: 178 MG/DL
CHOLEST/HDLC SERPL: 2.7 (CALC)
CO2 SERPL-SCNC: 27 MMOL/L (ref 20–32)
CREAT SERPL-MCNC: 0.61 MG/DL (ref 0.5–0.96)
CRP SERPL-MCNC: 10.4 MG/L
EGFRCR SERPLBLD CKD-EPI 2021: 126 ML/MIN/1.73M2
ERYTHROCYTE [DISTWIDTH] IN BLOOD BY AUTOMATED COUNT: 12.7 % (ref 11–15)
EST. AVERAGE GLUCOSE BLD GHB EST-MCNC: 108 MG/DL
EST. AVERAGE GLUCOSE BLD GHB EST-SCNC: 6 MMOL/L
GLUCOSE SERPL-MCNC: 74 MG/DL (ref 65–99)
HBA1C MFR BLD: 5.4 % OF TOTAL HGB
HCT VFR BLD AUTO: 47.6 % (ref 35–45)
HDLC SERPL-MCNC: 65 MG/DL
HGB BLD-MCNC: 15 G/DL (ref 11.7–15.5)
LDLC SERPL CALC-MCNC: 96 MG/DL (CALC)
MCH RBC QN AUTO: 28.8 PG (ref 27–33)
MCHC RBC AUTO-ENTMCNC: 31.5 G/DL (ref 32–36)
MCV RBC AUTO: 91.4 FL (ref 80–100)
NONHDLC SERPL-MCNC: 113 MG/DL (CALC)
PLATELET # BLD AUTO: 612 THOUSAND/UL (ref 140–400)
PMV BLD REES-ECKER: 10.2 FL (ref 7.5–12.5)
POTASSIUM SERPL-SCNC: 4.4 MMOL/L (ref 3.5–5.3)
PROT SERPL-MCNC: 7.3 G/DL (ref 6.1–8.1)
RBC # BLD AUTO: 5.21 MILLION/UL (ref 3.8–5.1)
SODIUM SERPL-SCNC: 139 MMOL/L (ref 135–146)
TRIGL SERPL-MCNC: 80 MG/DL
TSH SERPL-ACNC: 1.52 MIU/L
WBC # BLD AUTO: 14.2 THOUSAND/UL (ref 3.8–10.8)

## 2025-03-05 ENCOUNTER — APPOINTMENT (OUTPATIENT)
Dept: PRIMARY CARE | Facility: CLINIC | Age: 30
End: 2025-03-05
Payer: COMMERCIAL

## 2025-03-06 ENCOUNTER — TELEPHONE (OUTPATIENT)
Dept: HEMATOLOGY/ONCOLOGY | Facility: HOSPITAL | Age: 30
End: 2025-03-06
Payer: COMMERCIAL

## 2025-03-06 NOTE — TELEPHONE ENCOUNTER
Patient scheduled for MD follow up with Dr. Currie on Thurs, 4/10/25. Due to a change in provider's schedule, this appointment needs to be rescheduled. Attempted to reach patient to reschedule. Left detailed message, with call back number, for patient to call back at earliest convenience.

## 2025-03-10 NOTE — TELEPHONE ENCOUNTER
Patient scheduled for MD follow up with Dr. Currie on Thurs, 4/10/25. Due to a change in provider's schedule, this appointment needs to be rescheduled. Attempted to reach patient x2 to reschedule. Left detailed message, with call back number, for patient to call back at earliest convenience. Sent patient a message via [employer].

## 2025-03-12 NOTE — TELEPHONE ENCOUNTER
Patient scheduled for MD follow up with Dr. Currie on Thurs, 4/10/25. Due to a change in provider's schedule, this appointment needs to be rescheduled.  Reached out to patient to reschedule. Patient agreeable. Rescheduled to Tues, 4/15/25 at 2:40 PM. Patient verbalized understanding and agreement regarding discussed information via verbal feedback.

## 2025-03-14 ENCOUNTER — APPOINTMENT (OUTPATIENT)
Dept: GASTROENTEROLOGY | Facility: CLINIC | Age: 30
End: 2025-03-14
Payer: COMMERCIAL

## 2025-04-07 DIAGNOSIS — D32.9 MENINGIOMA (MULTI): Primary | ICD-10-CM

## 2025-04-07 DIAGNOSIS — H05.89 ORBITAL LESION: ICD-10-CM

## 2025-04-10 ENCOUNTER — APPOINTMENT (OUTPATIENT)
Dept: HEMATOLOGY/ONCOLOGY | Facility: HOSPITAL | Age: 30
End: 2025-04-10
Payer: COMMERCIAL

## 2025-04-15 ENCOUNTER — OFFICE VISIT (OUTPATIENT)
Dept: HEMATOLOGY/ONCOLOGY | Facility: HOSPITAL | Age: 30
End: 2025-04-15
Payer: COMMERCIAL

## 2025-04-15 ENCOUNTER — HOSPITAL ENCOUNTER (OUTPATIENT)
Dept: RADIOLOGY | Facility: HOSPITAL | Age: 30
Discharge: HOME | End: 2025-04-15
Payer: COMMERCIAL

## 2025-04-15 VITALS
OXYGEN SATURATION: 99 % | RESPIRATION RATE: 16 BRPM | DIASTOLIC BLOOD PRESSURE: 77 MMHG | BODY MASS INDEX: 39.98 KG/M2 | SYSTOLIC BLOOD PRESSURE: 109 MMHG | HEIGHT: 63 IN | WEIGHT: 225.64 LBS | HEART RATE: 82 BPM | TEMPERATURE: 95.9 F

## 2025-04-15 DIAGNOSIS — D72.829 LEUKOCYTOSIS, UNSPECIFIED TYPE: Primary | ICD-10-CM

## 2025-04-15 DIAGNOSIS — D75.839 THROMBOCYTOSIS: ICD-10-CM

## 2025-04-15 DIAGNOSIS — R05.9 COUGH, UNSPECIFIED TYPE: ICD-10-CM

## 2025-04-15 DIAGNOSIS — R63.4 UNINTENTIONAL WEIGHT LOSS: ICD-10-CM

## 2025-04-15 PROCEDURE — 3008F BODY MASS INDEX DOCD: CPT | Performed by: INTERNAL MEDICINE

## 2025-04-15 PROCEDURE — 71046 X-RAY EXAM CHEST 2 VIEWS: CPT

## 2025-04-15 PROCEDURE — 99215 OFFICE O/P EST HI 40 MIN: CPT | Performed by: INTERNAL MEDICINE

## 2025-04-15 PROCEDURE — 99205 OFFICE O/P NEW HI 60 MIN: CPT | Performed by: INTERNAL MEDICINE

## 2025-04-15 ASSESSMENT — PAIN SCALES - GENERAL: PAINLEVEL_OUTOF10: 0-NO PAIN

## 2025-04-15 ASSESSMENT — ENCOUNTER SYMPTOMS
RESPIRATORY NEGATIVE: 1
GASTROINTESTINAL NEGATIVE: 1
CONSTITUTIONAL NEGATIVE: 1
OCCASIONAL FEELINGS OF UNSTEADINESS: 0
LOSS OF SENSATION IN FEET: 0
DEPRESSION: 0
CARDIOVASCULAR NEGATIVE: 1

## 2025-04-15 ASSESSMENT — COLUMBIA-SUICIDE SEVERITY RATING SCALE - C-SSRS
6. HAVE YOU EVER DONE ANYTHING, STARTED TO DO ANYTHING, OR PREPARED TO DO ANYTHING TO END YOUR LIFE?: NO
1. IN THE PAST MONTH, HAVE YOU WISHED YOU WERE DEAD OR WISHED YOU COULD GO TO SLEEP AND NOT WAKE UP?: NO
2. HAVE YOU ACTUALLY HAD ANY THOUGHTS OF KILLING YOURSELF?: NO

## 2025-04-15 ASSESSMENT — LIFESTYLE VARIABLES
HOW MANY STANDARD DRINKS CONTAINING ALCOHOL DO YOU HAVE ON A TYPICAL DAY: 1 OR 2
AUDIT-C TOTAL SCORE: 1
SKIP TO QUESTIONS 9-10: 1
HOW OFTEN DO YOU HAVE A DRINK CONTAINING ALCOHOL: MONTHLY OR LESS
HOW OFTEN DO YOU HAVE SIX OR MORE DRINKS ON ONE OCCASION: NEVER

## 2025-04-15 NOTE — PROGRESS NOTES
"Patient ID: Heydi Tineo is a 29 y.o. female.  Referring Physician: Valeria Mac PA-C  [address] [place] [address]  Primary Care Provider: Valeria Mac PA-C  Referral Reason: Leukocytosis    Subjective:  Referred for leukocytosis. Denies B symptoms. No chest/abdominal pain.   Has had frequent skin infections but improved since she started losing weight last year. Lost about 30 lbs in 9 months. Since then her periods improved as well.     Assessment/Plan:  ? Leukocytosis: Chronic mild. Neut predominant. WBC was up to 20K but last one was down to 14K. Obese but losing weight. Quit smoking 4 years ago. Heavy periods now improved.   Check labs today. Most likely this is reactive. If today's work-up is negative, she would not require further work-up.   Will check her Iron studies, too. If low, will give her PO iron    Review Of Systems:  Review of Systems   Constitutional: Negative.    HENT:  Negative.     Respiratory: Negative.     Cardiovascular: Negative.    Gastrointestinal: Negative.        Physical Exam:  /77 (BP Location: Left arm, Patient Position: Sitting, BP Cuff Size: Adult)   Pulse 82   Temp 35.5 °C (95.9 °F) (Temporal)   Resp 16   Ht 1.6 m (5' 3\")   Wt 102 kg (225 lb 10.3 oz)   SpO2 99%   BMI 39.97 kg/m²   BSA: 2.13 meters squared  Performance Status: Asymptomatic  Physical Exam  HENT:      Head: Normocephalic and atraumatic.   Eyes:      General: No scleral icterus.  Pulmonary:      Effort: Pulmonary effort is normal.   Musculoskeletal:         General: Normal range of motion.   Skin:     Coloration: Skin is not jaundiced.   Neurological:      General: No focal deficit present.      Mental Status: She is alert and oriented to person, place, and time.         Results:  Diagnostic Results   Lab Results   Component Value Date    WBC 14.2 (H) 01/27/2025    HGB 15.0 01/27/2025    HCT 47.6 (H) 01/27/2025    MCV 91.4 01/27/2025     (H) 01/27/2025     Lab Results   Component " Value Date    CALCIUM 9.7 01/27/2025     01/27/2025    K 4.4 01/27/2025    CO2 27 01/27/2025     01/27/2025    BUN 16 01/27/2025    CREATININE 0.61 01/27/2025    ALT 12 01/27/2025    AST 14 01/27/2025       Current Outpatient Medications:     cetirizine (ZyrTEC) 10 mg tablet, Take 1 tablet (10 mg) by mouth once daily., Disp: 90 tablet, Rfl: 3    omeprazole (PriLOSEC) 40 mg DR capsule, Take 1 capsule (40 mg) by mouth once daily. Take 1 capsule (40 mg) by mouth once daily., Disp: 90 capsule, Rfl: 3    sertraline (Zoloft) 100 mg tablet, Take 1 tablet (100 mg) by mouth once daily., Disp: 90 tablet, Rfl: 3    norethindrone-e.estradioL-iron (Junel FE 1/20) 1 mg-20 mcg (21)/75 mg (7) tablet, Take 1 tablet by mouth once daily for 28 days., Disp: 28 tablet, Rfl: 0     Past Surgical History:   Procedure Laterality Date    OTHER SURGICAL HISTORY  01/28/2019    Tonsillectomy with adenoidectomy    OTHER SURGICAL HISTORY  01/28/2019    Ear pressure equalization tube insertion     Family History   Problem Relation Name Age of Onset    Deep vein thrombosis Father's Brother        reports that she has never smoked. She has never used smokeless tobacco.  Social History     Socioeconomic History    Marital status: Single     Spouse name: Not on file    Number of children: Not on file    Years of education: Not on file    Highest education level: Not on file   Occupational History    Not on file   Tobacco Use    Smoking status: Never    Smokeless tobacco: Never   Substance and Sexual Activity    Alcohol use: Never    Drug use: Defer    Sexual activity: Defer   Other Topics Concern    Not on file   Social History Narrative    Not on file     Social Drivers of Health     Financial Resource Strain: Not on file   Food Insecurity: Not on file   Transportation Needs: Not on file   Physical Activity: Not on file   Stress: Not on file   Social Connections: Not on file   Intimate Partner Violence: Not on file   Housing Stability:  Not on file       Diagnoses and all orders for this visit:  Leukocytosis, unspecified type  -     Referral To Hematology and Oncology  -     CBC and Auto Differential; Future  -     Comprehensive Metabolic Panel; Future  -     Lactate Dehydrogenase; Future  -     Vitamin B12; Future  -     Iron and TIBC; Future  -     Ferritin; Future  -     Sedimentation Rate; Future  -     Slide Request; Future  -     Myeloid Malignancies Panel; Future  Thrombocytosis  -     Referral To Hematology and Oncology       Kelsy Pulido MD

## 2025-04-18 ENCOUNTER — HOSPITAL ENCOUNTER (OUTPATIENT)
Dept: RADIOLOGY | Facility: HOSPITAL | Age: 30
Discharge: HOME | End: 2025-04-18
Payer: COMMERCIAL

## 2025-04-18 DIAGNOSIS — H05.89 ORBITAL LESION: ICD-10-CM

## 2025-04-18 DIAGNOSIS — D32.9 MENINGIOMA (MULTI): ICD-10-CM

## 2025-04-23 ENCOUNTER — APPOINTMENT (OUTPATIENT)
Dept: RADIOLOGY | Facility: HOSPITAL | Age: 30
End: 2025-04-23
Payer: COMMERCIAL

## 2025-04-23 ENCOUNTER — HOSPITAL ENCOUNTER (EMERGENCY)
Facility: HOSPITAL | Age: 30
Discharge: HOME | End: 2025-04-23
Attending: EMERGENCY MEDICINE
Payer: COMMERCIAL

## 2025-04-23 ENCOUNTER — PATIENT MESSAGE (OUTPATIENT)
Dept: NEUROSURGERY | Facility: HOSPITAL | Age: 30
End: 2025-04-23
Payer: COMMERCIAL

## 2025-04-23 VITALS
HEIGHT: 63 IN | SYSTOLIC BLOOD PRESSURE: 128 MMHG | BODY MASS INDEX: 40.75 KG/M2 | DIASTOLIC BLOOD PRESSURE: 79 MMHG | RESPIRATION RATE: 18 BRPM | OXYGEN SATURATION: 99 % | TEMPERATURE: 97.3 F | WEIGHT: 230 LBS | HEART RATE: 78 BPM

## 2025-04-23 DIAGNOSIS — D32.9 MENINGIOMA (MULTI): ICD-10-CM

## 2025-04-23 DIAGNOSIS — D18.09 CAVERNOUS HEMANGIOMA OF ORBIT: ICD-10-CM

## 2025-04-23 DIAGNOSIS — H05.89 ORBITAL LESION: Primary | ICD-10-CM

## 2025-04-23 DIAGNOSIS — H05.231 PERIORBITAL HEMATOMA OF RIGHT EYE: Primary | ICD-10-CM

## 2025-04-23 PROCEDURE — 70450 CT HEAD/BRAIN W/O DYE: CPT | Performed by: INTERNAL MEDICINE

## 2025-04-23 PROCEDURE — 70450 CT HEAD/BRAIN W/O DYE: CPT

## 2025-04-23 PROCEDURE — 99284 EMERGENCY DEPT VISIT MOD MDM: CPT | Mod: 25 | Performed by: EMERGENCY MEDICINE

## 2025-04-23 ASSESSMENT — COLUMBIA-SUICIDE SEVERITY RATING SCALE - C-SSRS
2. HAVE YOU ACTUALLY HAD ANY THOUGHTS OF KILLING YOURSELF?: NO
6. HAVE YOU EVER DONE ANYTHING, STARTED TO DO ANYTHING, OR PREPARED TO DO ANYTHING TO END YOUR LIFE?: NO
1. IN THE PAST MONTH, HAVE YOU WISHED YOU WERE DEAD OR WISHED YOU COULD GO TO SLEEP AND NOT WAKE UP?: NO

## 2025-04-23 ASSESSMENT — PAIN DESCRIPTION - DESCRIPTORS: DESCRIPTORS: PRESSURE

## 2025-04-23 ASSESSMENT — VISUAL ACUITY
OD: 20/15
OS: 20/15
OU: 20/15

## 2025-04-23 ASSESSMENT — PAIN DESCRIPTION - PAIN TYPE: TYPE: ACUTE PAIN

## 2025-04-23 ASSESSMENT — PAIN DESCRIPTION - ORIENTATION: ORIENTATION: RIGHT

## 2025-04-23 ASSESSMENT — PAIN SCALES - GENERAL
PAINLEVEL_OUTOF10: 3
PAINLEVEL_OUTOF10: 0 - NO PAIN

## 2025-04-23 ASSESSMENT — PAIN - FUNCTIONAL ASSESSMENT: PAIN_FUNCTIONAL_ASSESSMENT: 0-10

## 2025-04-23 ASSESSMENT — PAIN DESCRIPTION - LOCATION: LOCATION: EYE

## 2025-04-23 NOTE — PROGRESS NOTES
Spoke with patient via phone. Patient with an episode of nausea with vomiting this morning. Pictures received and she has orbital swelling and ecchymosis. While initially having blurred vision this resolved quickly and without diplopia. Able to close her eye completely. Intermittent pain due to swelling.     She went for her MRI on Friday but while she was there she was marked as not having shown despite being changed and ready for her imaging. MRI was rescheduled but not until 05/07/25.     Patient informed me that her swelling has gotten worse since this Am and with worsening bruising. While patient has an ophthalmologist and could likely been seen tomorrow given worsening swelling this evening I recommended she go to the ED for urgent evaluation.     She is pending ED evaluation but if MRI is not completed based on workup I have ordered her a STAT MRI Orbit to be completed sooner than 05/07/25. I will follow along with her progress and patient was given our patient line to call with any updates.

## 2025-04-23 NOTE — ED PROVIDER NOTES
HPI   Chief Complaint   Patient presents with    Eye Problem       29-year-old female with orbital cavernous hemangioma right eye presents with periorbital hematoma.  She sneezed this morning and developed swelling and bruising to the right upper eyelid.  She has no vision changes.  No headache.  Spoke with neurosurgery team and they sent her in for evaluation.  They have expedited a plan for MRI orbit.      History provided by:  Patient and medical records          Patient History   Medical History[1]  Surgical History[2]  Family History[3]  Social History[4]    Physical Exam   ED Triage Vitals [04/23/25 1834]   Temperature Heart Rate Respirations BP   36.2 °C (97.2 °F) 83 17 131/83      SpO2 Temp src Heart Rate Source Patient Position   98 % -- -- --      BP Location FiO2 (%)     -- --       Physical Exam  Vitals and nursing note reviewed.   Constitutional:       General: She is not in acute distress.     Appearance: She is well-developed.   HENT:      Head: Normocephalic and atraumatic.   Eyes:      Extraocular Movements: Extraocular movements intact.      Conjunctiva/sclera: Conjunctivae normal.      Pupils: Pupils are equal, round, and reactive to light.        Comments: Right periorbital hematoma.  No extraocular muscle entrapment.   Cardiovascular:      Rate and Rhythm: Normal rate and regular rhythm.      Heart sounds: No murmur heard.  Pulmonary:      Effort: Pulmonary effort is normal. No respiratory distress.      Breath sounds: Normal breath sounds.   Abdominal:      Palpations: Abdomen is soft.      Tenderness: There is no abdominal tenderness.   Musculoskeletal:         General: No swelling.      Cervical back: Neck supple.   Skin:     General: Skin is warm and dry.      Capillary Refill: Capillary refill takes less than 2 seconds.   Neurological:      Mental Status: She is alert.   Psychiatric:         Mood and Affect: Mood normal.           ED Course & MDM   ED Course as of 04/23/25 1958 Wed Apr  23, 2025 1851 29-year-old female with known cavernous hemangioma of the right orbit presents with periorbital hematoma after sneezing.  Visual acuity.  CT head [BT]   1852 Visual acuity left eye 20/15.  Visual acuity right eye 20/15.  This is corrected with her glasses. [BT]   1957 CT head wo IV contrast  CT head with periorbital hematoma.  Cavernous hemangioma unchanged.  Supportive care.  Follow-up with neurosurgery.  Return with any concerns.  Discharged home. [BT]      ED Course User Index  [BT] Vinh Dickens DO         Diagnoses as of 04/23/25 1958   Periorbital hematoma of right eye   Cavernous hemangioma of orbit     CT head wo IV contrast   Final Result   New right periorbital fluid collection is seen, compatible with   history of periorbital hematoma. Patient's known right orbital   cavernous hemangioma appears similar to prior exam. No acute   intracranial abnormality is evident.             MACRO:   None        Signed by: Anu Henry 4/23/2025 7:39 PM   Dictation workstation:   [id number]                        No data recorded     [place] Coma Scale Score: 15 (04/23/25 1836 : Keyona Rutherford RN)                           Medical Decision Making      Procedure  Procedures       [1]   Past Medical History:  Diagnosis Date    Other specified health status     Known health problems: none   [2]   Past Surgical History:  Procedure Laterality Date    OTHER SURGICAL HISTORY  01/28/2019    Tonsillectomy with adenoidectomy    OTHER SURGICAL HISTORY  01/28/2019    Ear pressure equalization tube insertion   [3]   Family History  Problem Relation Name Age of Onset    Deep vein thrombosis Father's Brother     [4]   Social History  Tobacco Use    Smoking status: Never    Smokeless tobacco: Never   Substance Use Topics    Alcohol use: Never    Drug use: Defer        Vinh Dickens DO  04/23/25 1958

## 2025-04-23 NOTE — ED TRIAGE NOTES
Pt here ambulatory to ED with complaint of right eyelid swelling and bruising, states it started this morning following her vomiting this morning.

## 2025-04-25 ENCOUNTER — HOSPITAL ENCOUNTER (OUTPATIENT)
Dept: RADIOLOGY | Facility: HOSPITAL | Age: 30
Discharge: HOME | End: 2025-04-25
Payer: COMMERCIAL

## 2025-04-25 DIAGNOSIS — D32.9 MENINGIOMA (MULTI): ICD-10-CM

## 2025-04-25 DIAGNOSIS — H05.89 ORBITAL LESION: ICD-10-CM

## 2025-04-25 PROCEDURE — 70543 MRI ORBT/FAC/NCK W/O &W/DYE: CPT

## 2025-04-25 PROCEDURE — 2550000001 HC RX 255 CONTRASTS: Mod: JW | Performed by: NURSE PRACTITIONER

## 2025-04-25 PROCEDURE — A9575 INJ GADOTERATE MEGLUMI 0.1ML: HCPCS | Mod: JW | Performed by: NURSE PRACTITIONER

## 2025-04-25 RX ORDER — GADOTERATE MEGLUMINE 376.9 MG/ML
21 INJECTION INTRAVENOUS
Status: COMPLETED | OUTPATIENT
Start: 2025-04-25 | End: 2025-04-25

## 2025-04-25 RX ADMIN — GADOTERATE MEGLUMINE 21 ML: 376.9 INJECTION INTRAVENOUS at 08:04

## 2025-05-07 ENCOUNTER — APPOINTMENT (OUTPATIENT)
Facility: HOSPITAL | Age: 30
End: 2025-05-07
Payer: COMMERCIAL

## 2025-05-22 ENCOUNTER — OFFICE VISIT (OUTPATIENT)
Dept: PRIMARY CARE | Facility: CLINIC | Age: 30
End: 2025-05-22
Payer: COMMERCIAL

## 2025-05-22 VITALS
WEIGHT: 233 LBS | OXYGEN SATURATION: 98 % | SYSTOLIC BLOOD PRESSURE: 115 MMHG | HEIGHT: 63 IN | DIASTOLIC BLOOD PRESSURE: 62 MMHG | HEART RATE: 82 BPM | TEMPERATURE: 97.4 F | BODY MASS INDEX: 41.29 KG/M2

## 2025-05-22 DIAGNOSIS — B35.4 TINEA CORPORIS: Primary | ICD-10-CM

## 2025-05-22 PROCEDURE — 3008F BODY MASS INDEX DOCD: CPT | Performed by: PHYSICIAN ASSISTANT

## 2025-05-22 PROCEDURE — 99213 OFFICE O/P EST LOW 20 MIN: CPT | Performed by: PHYSICIAN ASSISTANT

## 2025-05-22 PROCEDURE — 1036F TOBACCO NON-USER: CPT | Performed by: PHYSICIAN ASSISTANT

## 2025-05-22 RX ORDER — CLOTRIMAZOLE AND BETAMETHASONE DIPROPIONATE 10; .64 MG/G; MG/G
1 CREAM TOPICAL 2 TIMES DAILY
Qty: 30 G | Refills: 0 | Status: SHIPPED | OUTPATIENT
Start: 2025-05-22 | End: 2025-05-29

## 2025-05-22 ASSESSMENT — ENCOUNTER SYMPTOMS
RHINORRHEA: 0
DIARRHEA: 0
EYE PAIN: 0
FATIGUE: 0
SHORTNESS OF BREATH: 0
FEVER: 0
SORE THROAT: 0
VOMITING: 0
ANOREXIA: 0
NAIL CHANGES: 0
COUGH: 0

## 2025-05-22 ASSESSMENT — PATIENT HEALTH QUESTIONNAIRE - PHQ9
10. IF YOU CHECKED OFF ANY PROBLEMS, HOW DIFFICULT HAVE THESE PROBLEMS MADE IT FOR YOU TO DO YOUR WORK, TAKE CARE OF THINGS AT HOME, OR GET ALONG WITH OTHER PEOPLE: SOMEWHAT DIFFICULT
SUM OF ALL RESPONSES TO PHQ9 QUESTIONS 1 AND 2: 2
2. FEELING DOWN, DEPRESSED OR HOPELESS: SEVERAL DAYS
1. LITTLE INTEREST OR PLEASURE IN DOING THINGS: SEVERAL DAYS

## 2025-05-22 NOTE — PROGRESS NOTES
"Answers submitted by the patient for this visit:  Rash Questionnaire (Submitted on 5/22/2025)  Chief Complaint: Rash  Chronicity: new  Onset: 1 to 4 weeks ago  Progression since onset: gradually worsening  Affected locations: left upper leg, left leg, right upper leg, right leg  Characteristics: itchiness  Exposed to: nothing  anorexia: No  congestion: No  cough: No  diarrhea: No  eye pain: No  facial edema: No  fatigue: No  fever: No  joint pain: No  nail changes: No  rhinorrhea: No  shortness of breath: No  sore throat: No  vomiting: No  Subjective     HPI   Heydi Tieno is a 29 y.o. year old female patient with presenting to clinic with concern for   Chief Complaint   Patient presents with    Rash     Various spots arms and legs, itchy, sometimes hot to the touch   X 1.5 weeks        R thigh, bilat LE itchy. Scaling, dry. Has spread  Ongoing 1.5 weeks. Has not tried other options.  Has cats. No other household members with rashes.    Problem List[1]    Medical History[2]   Surgical History[3]   Family History[4]   Social History     Tobacco Use    Smoking status: Never    Smokeless tobacco: Never   Substance Use Topics    Alcohol use: Never      Current Medications[5]     Review of Systems  Constitutional: Denies fever  HEENT: Denies ST, earache  CVS: Denies Chest pain  Pulmonary: Denies wheezing, SOB  GI: Denies N/V  : Denies dysuria  Musculoskeletal:  Denies myalgia  Neuro: Denies focal weakness or numbness.  Skin: Denies Rashes.  *Review of Systems is negative unless otherwise mentioned in HPI or ROS above.    Objective   /62   Pulse 82   Temp 36.3 °C (97.4 °F)   Ht 1.6 m (5' 3\")   Wt 106 kg (233 lb)   SpO2 98%   BMI 41.27 kg/m²  reviewed Body mass index is 41.27 kg/m².     Physical Exam  Constitutional: NAD.  Resting comfortably.  Head: Atraumatic, normocephalic.  ENT: Moist oral mucosa. Nasal mucosa wnl.   Cardiac: Regular rate & rhythm.   Pulmonary: Lungs clear bilat  GI: Soft, Nontender, " nondistended.   Musculoskeletal: No peripheral edema.   Skin: No evidence of trauma. Erythematous base scaling dry rashes in linear patterns to ankles, Semicircular flaking silvery pattern on right thigh.  Psych: Intact judgement and insight.    .Assessment/Plan   Problem List Items Addressed This Visit    None  Visit Diagnoses         Codes      Tinea corporis    -  Primary B35.4    Relevant Medications    clotrimazole-betamethasone (Lotrisone) cream                 [1]   Patient Active Problem List  Diagnosis    Anxiety and depression    Chronic fatigue    Dyslipidemia    GERD without esophagitis    Hyperprolactinemia (Multi)    Obesity, Class III, BMI 40-49.9 (morbid obesity)    PCOS (polycystic ovarian syndrome)    Pernicious anemia    Vitamin D deficiency    Insomnia    Goiter   [2]   Past Medical History:  Diagnosis Date    Other specified health status     Known health problems: none   [3]   Past Surgical History:  Procedure Laterality Date    OTHER SURGICAL HISTORY  01/28/2019    Tonsillectomy with adenoidectomy    OTHER SURGICAL HISTORY  01/28/2019    Ear pressure equalization tube insertion   [4]   Family History  Problem Relation Name Age of Onset    Deep vein thrombosis Father's Brother     [5]   Current Outpatient Medications:     omeprazole (PriLOSEC) 40 mg DR capsule, Take 1 capsule (40 mg) by mouth once daily. Take 1 capsule (40 mg) by mouth once daily., Disp: 90 capsule, Rfl: 3    sertraline (Zoloft) 100 mg tablet, Take 1 tablet (100 mg) by mouth once daily., Disp: 90 tablet, Rfl: 3    cetirizine (ZyrTEC) 10 mg tablet, Take 1 tablet (10 mg) by mouth once daily., Disp: 90 tablet, Rfl: 3    clotrimazole-betamethasone (Lotrisone) cream, Apply 1 Application topically 2 times a day for 7 days., Disp: 30 g, Rfl: 0

## 2025-05-30 ENCOUNTER — APPOINTMENT (OUTPATIENT)
Dept: GASTROENTEROLOGY | Facility: CLINIC | Age: 30
End: 2025-05-30
Payer: COMMERCIAL

## 2025-07-10 ENCOUNTER — APPOINTMENT (OUTPATIENT)
Dept: PRIMARY CARE | Facility: CLINIC | Age: 30
End: 2025-07-10
Payer: COMMERCIAL

## 2025-07-10 VITALS
TEMPERATURE: 97.2 F | BODY MASS INDEX: 38.98 KG/M2 | WEIGHT: 220 LBS | OXYGEN SATURATION: 98 % | DIASTOLIC BLOOD PRESSURE: 82 MMHG | SYSTOLIC BLOOD PRESSURE: 108 MMHG | HEIGHT: 63 IN | HEART RATE: 101 BPM

## 2025-07-10 DIAGNOSIS — F51.01 PRIMARY INSOMNIA: Primary | ICD-10-CM

## 2025-07-10 DIAGNOSIS — R63.4 UNINTENTIONAL WEIGHT LOSS OF MORE THAN 10% BODY WEIGHT WITHIN 6 MONTHS: ICD-10-CM

## 2025-07-10 DIAGNOSIS — E78.5 BORDERLINE HYPERLIPIDEMIA: ICD-10-CM

## 2025-07-10 DIAGNOSIS — E55.9 VITAMIN D INSUFFICIENCY: ICD-10-CM

## 2025-07-10 DIAGNOSIS — Z00.00 ROUTINE GENERAL MEDICAL EXAMINATION AT A HEALTH CARE FACILITY: ICD-10-CM

## 2025-07-10 PROCEDURE — 3008F BODY MASS INDEX DOCD: CPT | Performed by: PHYSICIAN ASSISTANT

## 2025-07-10 PROCEDURE — 99395 PREV VISIT EST AGE 18-39: CPT | Performed by: PHYSICIAN ASSISTANT

## 2025-07-10 RX ORDER — TRAZODONE HYDROCHLORIDE 50 MG/1
TABLET ORAL
Qty: 90 TABLET | Refills: 2 | Status: SHIPPED | OUTPATIENT
Start: 2025-07-10

## 2025-07-10 ASSESSMENT — PATIENT HEALTH QUESTIONNAIRE - PHQ9
SUM OF ALL RESPONSES TO PHQ9 QUESTIONS 1 AND 2: 0
2. FEELING DOWN, DEPRESSED OR HOPELESS: NOT AT ALL
1. LITTLE INTEREST OR PLEASURE IN DOING THINGS: NOT AT ALL

## 2025-07-10 NOTE — PROGRESS NOTES
Subjective     HPI   Heydi Tineo is a 29 y.o. year old female patient with presenting to clinic with concern for   Chief Complaint   Patient presents with    Annual Exam       Unintentional weight loss- has been off OCP. Max weight was 290lb  Est 16% body weight loss int he past 7 months.     Endorses:  Nausea    Denies fevers or night sweats. But weight loss is concerning.   She had blood work ordered by Dr Currie in April, but I did not realize it was not completed yet.  CXR in April  Has upcomign appointmetn with GI resched from May     BP Readings from Last 5 Encounters:   07/10/25 108/82   25 115/62   25 128/79   04/15/25 109/77   25 110/74     Lab Results   Component Value Date    LDLCALC 96 2025     Depression  -zoloft  100     GERD  -omeprazole 40     allergic rhinitis  -zyrtec     Anemia   Mild thrombocytosis chronically noted.   Saw Dr Currie in April     Multinodal goiter  unchanged on US. T3 and T4 both elevated  Lab Results   Component Value Date    TSH 1.52 2025    H1VBENX 165 2023    THYROIDPAB <28 2023     Insomnia  -trazodone     Family history autoimmune disorder, sister with lupus  Chronic fatigue and malaise  Hx elevated CRP,  LISA negative MAURI reflex was unremarkable.     Contraception & PCOS  Dr Ahuja GYN     Meningioma- annual follow up MRI & Neuorsurgery/ NeuroOphthamology  Follow Up: Continue following with ophthalmology and will plan for repeat MRI orbit w/wo in 1 year.        Problem List[1]    Medical History[2]   Surgical History[3]   Family History[4]   Social History     Tobacco Use    Smoking status: Former     Current packs/day: 0.00     Average packs/day: 0.3 packs/day for 5.0 years (1.3 ttl pk-yrs)     Types: Cigarettes     Quit date: 2017     Years since quittin.5    Smokeless tobacco: Never   Substance Use Topics    Alcohol use: Never      Current Medications[5]     Review of Systems  Constitutional: Denies fever  HEENT:  "Denies ST, earache  CVS: Denies Chest pain  Pulmonary: Denies wheezing, SOB  GI: Denies N/V  : Denies dysuria  Musculoskeletal:  Denies myalgia  Neuro: Denies focal weakness or numbness.  Skin: Denies Rashes.  *Review of Systems is negative unless otherwise mentioned in HPI or ROS above.    Objective   /82   Pulse 101   Temp 36.2 °C (97.2 °F)   Ht 1.6 m (5' 3\")   Wt 99.8 kg (220 lb)   SpO2 98%   BMI 38.97 kg/m²  reviewed Body mass index is 38.97 kg/m².     Physical Exam  Constitutional: NAD.  Resting comfortably.  Head: Atraumatic, normocephalic.  ENT: Moist oral mucosa. Nasal mucosa wnl.   Cardiac: Regular rate & rhythm.   Pulmonary: Lungs clear bilat  GI: Soft, Nontender, nondistended.   Musculoskeletal: No peripheral edema.   Skin: No evidence of trauma. No rashes  Psych: Intact judgement and insight.    .Assessment/Plan   Problem List Items Addressed This Visit           ICD-10-CM    Insomnia - Primary G47.00    Relevant Medications    traZODone (Desyrel) 50 mg tablet     Other Visit Diagnoses         Codes      Borderline hyperlipidemia     E78.5    Relevant Orders    CBC    Comprehensive Metabolic Panel    TSH with reflex to Free T4 if abnormal    Lipid Panel      Vitamin D insufficiency     E55.9    Relevant Orders    Vitamin D 25-Hydroxy,Total (for eval of Vitamin D levels)      Routine general medical examination at a health care facility     Z00.00      Unintentional weight loss of more than 10% body weight within 6 months     R63.4                 [1]   Patient Active Problem List  Diagnosis    Anxiety and depression    Chronic fatigue    Dyslipidemia    GERD without esophagitis    Hyperprolactinemia (Multi)    Obesity, Class III, BMI 40-49.9 (morbid obesity)    PCOS (polycystic ovarian syndrome)    Pernicious anemia    Vitamin D deficiency    Insomnia    Goiter   [2]   Past Medical History:  Diagnosis Date    Anxiety     Depression     GERD (gastroesophageal reflux disease) 2017    Other " specified health status     Known health problems: none   [3]   Past Surgical History:  Procedure Laterality Date    OTHER SURGICAL HISTORY  01/28/2019    Tonsillectomy with adenoidectomy    OTHER SURGICAL HISTORY  01/28/2019    Ear pressure equalization tube insertion   [4]   Family History  Problem Relation Name Age of Onset    Deep vein thrombosis Father's Brother      Diabetes Maternal Grandfather Grandpa     Stroke Maternal Grandfather Grandpa     Dementia Maternal Grandfather Grandpa     Cataracts Maternal Grandfather Grandpa     Macular degeneration Maternal Grandfather Grandpa     Alzheimer's disease Maternal Grandfather Grandpa     Migraines Sister Sister     Cancer Paternal Grandfather Grandpa     Cancer Paternal Grandfather Grandpa     Diabetes Maternal Grandfather Grandpa     Stroke Maternal Grandfather Grandpa    [5]   Current Outpatient Medications:     cetirizine (ZyrTEC) 10 mg tablet, Take 1 tablet (10 mg) by mouth once daily., Disp: 90 tablet, Rfl: 3    ferrous sulfate (IRON ORAL), Take by mouth., Disp: , Rfl:     omeprazole (PriLOSEC) 40 mg DR capsule, Take 1 capsule (40 mg) by mouth once daily. Take 1 capsule (40 mg) by mouth once daily., Disp: 90 capsule, Rfl: 3    sertraline (Zoloft) 100 mg tablet, Take 1 tablet (100 mg) by mouth once daily., Disp: 90 tablet, Rfl: 3    traZODone (Desyrel) 50 mg tablet, 1-2 tablets 30 minutes before bedtime as needed, Disp: 90 tablet, Rfl: 2

## 2025-07-23 NOTE — PROGRESS NOTES
"Subjective    History Of Present Illness  Heydi Tineo is a 29 y.o. female presenting to GI clinic with a chief complaint of unintentional weight loss.    Pt referred to GI by PCP for reported 75 pound unintentional weight loss. Reports highest weight of 290.  She reports early satiety and never feels hungry.  She denies significant changes in her diet.    She reports GI symptoms, mainly nausea and vomiting which have been occurring for about 5-6 months.  The nausea hits her out of nowhere and she has vomiting, reports that she is vomiting \"spit up\".  Emesis is not normally acidic and she does not see food or blood in emesis.  Symptoms are occurring approximately 5-6 times per week.  Patient sometimes has nausea in the evening but it mostly occurs in the morning/during day. She doesn't think that stress makes symptoms worse and cannot identify triggers for symptoms.  She is taking a Wonder Belly (262 mg bismuth) for nausea and feels that it helps.     She has a history of GERD and been on omeprazole for some time now and takes this at bedtime. She denies dysphagia.     Around time of symptom onset a few times per month she has the urge to have a bm and passes only mucous. Moves bowels daily normally, a couple of times per day, stool is formed, denies straining or blood in stool.     Diet recall  Drinks 120-160 ounces water daily  No breakfast  Eats yogurt or a banana  Doesn't get hungry until like 5:00pm   Dinner is normally meat, veggies, starch.   Normally doesn't snack after dinner unless on her menses    Past medical history  Medical History[1]   Past surgical history  Surgical History[2]   Social History  She reports that she quit smoking about 8 years ago. Her smoking use included cigarettes. She has a 1.5 pack-year smoking history. She has never used smokeless tobacco. She reports that she does not drink alcohol and does not use drugs.  She does not take NSAIDs on a regular basis    Family History  Family " "History[3]    The patient does not have a FH of CRC. she does not have a FH of IBD    Medications  Medications ordered prior to the current encounter[4]     Review of Systems   Constitutional:  Positive for appetite change and unexpected weight change.   Gastrointestinal:  Positive for nausea and vomiting.        See HPI   All other systems reviewed and are negative.         Objective       Physical Exam  Constitutional:       General: She is not in acute distress.     Appearance: Normal appearance. She is obese. She is not ill-appearing.   HENT:      Head: Normocephalic and atraumatic.      Mouth/Throat:      Mouth: Mucous membranes are moist.     Eyes:      General: No scleral icterus.     Conjunctiva/sclera: Conjunctivae normal.      Pupils: Pupils are equal, round, and reactive to light.     Pulmonary:      Effort: Pulmonary effort is normal.   Abdominal:      General: There is no distension.      Palpations: Abdomen is soft. There is no mass.      Tenderness: There is no abdominal tenderness.      Hernia: No hernia is present.     Musculoskeletal:         General: Normal range of motion.      Cervical back: Normal range of motion.     Skin:     General: Skin is warm and dry.      Coloration: Skin is not jaundiced or pale.     Neurological:      Mental Status: She is alert. Mental status is at baseline.     Psychiatric:         Mood and Affect: Mood normal.         Behavior: Behavior normal.          Last Vital Signs  /77 (BP Location: Left arm, Patient Position: Sitting, BP Cuff Size: Adult)   Pulse 85   Temp 36.3 °C (97.3 °F) (Temporal)   Resp 16   Ht 1.6 m (5' 3\")   Wt 100 kg (221 lb 6.4 oz)   LMP 07/23/2025 (Approximate)   SpO2 97%   BMI 39.22 kg/m²      Relevant Results  Lab Results   Component Value Date    WBC 14.2 (H) 01/27/2025    HGB 15.0 01/27/2025    HCT 47.6 (H) 01/27/2025    MCV 91.4 01/27/2025     (H) 01/27/2025       Lab Results   Component Value Date    GLUCOSE 74 01/27/2025 " "   CALCIUM 9.7 01/27/2025     01/27/2025    K 4.4 01/27/2025    CO2 27 01/27/2025     01/27/2025    BUN 16 01/27/2025    CREATININE 0.61 01/27/2025       Lab Results   Component Value Date    ALT 12 01/27/2025    AST 14 01/27/2025    ALKPHOS 92 01/27/2025    BILITOT 0.3 01/27/2025    INR CANCELED 09/17/2023       Lab Results   Component Value Date    IRON 91 08/05/2022    TIBC 437 08/05/2022    IRONSAT 21 (L) 08/05/2022    FERRITIN 76 08/05/2022    [id number] 468 05/19/2023    FOLATE 14.7 05/19/2023     No results found for: \"CALPS\"     C-REACTIVE PROTEIN   Date Value Ref Range Status   01/27/2025 10.4 (H) <8.0 mg/L Final     CRP   Date Value Ref Range Status   07/17/2023 1.37 (A) mg/dL Final     Comment:     REF VALUE  < 1.00   04/21/2021 3.32 (A) mg/dL Final     Comment:     REF VALUE  < 1.00          Lab Results   Component Value Date    LIPASE 20 02/02/2022       Lab Results   Component Value Date    HGBA1C 5.4 01/27/2025    HGBA1C 5.1 05/19/2023          Wt Readings from Last 10 Encounters:   07/25/25 100 kg (221 lb 6.4 oz)   07/10/25 99.8 kg (220 lb)   05/22/25 106 kg (233 lb)   04/23/25 104 kg (230 lb)   04/15/25 102 kg (225 lb 10.3 oz)   01/09/25 112 kg (246 lb)   10/24/23 120 kg (264 lb 8.8 oz)   07/25/23 125 kg (275 lb 4 oz)   06/20/23 124 kg (273 lb)   05/17/23 124 kg (274 lb 3.2 oz)       Assessment/Plan    Assessment & Plan  Unintentional weight loss  Nausea and vomiting, unspecified vomiting type  Patient reports 75 pound unintentional weight loss in the last year and reports her highest weight at approximately 290 pounds last year.  Over the last 6 months or so she reports a decreased appetite and sudden onset nausea and vomiting; emesis is described as \"spit up\" without food or hematemesis.  She also reports intermittent fecal urgency and passing only mucus from rectum approximately 2 days/month. There are no weights in our system from 2024, but it appears her highest recorded weight was " 275 pounds in July 2023 and weight in Jan 2025 was 246 lbs. She weighed 221 at appointment today.   Plan for EGD/colonoscopy for further evaluation-she will need to be off PPI and bismuth 2 weeks prior to procedures.    Referral to registered dietitian since her dietary recall shows that she is likely not eating enough calories per day.  Note that patient's Zoloft was increased approximately 7-8 months ago, may be contributing to her nausea/vomiting.   Will retime patient's omeprazole to daily in the mornings since she currently takes at bedtime and add famotidine twice daily.  Orders:    famotidine (Pepcid) 20 mg tablet; Take 1 tablet (20 mg) by mouth 2 times a day.    Referral to Nutrition Services; Future    Esophagogastroduodenoscopy (EGD); Future    Colonoscopy Diagnostic; Future       3 month follow up, after scopes        JAYMIE Cavazos-CNP  07/25/25              [1]   Past Medical History:  Diagnosis Date    Anxiety     Depression     GERD (gastroesophageal reflux disease) 2017    Headache     Low back pain     Other specified health status     Known health problems: none    PCOS (polycystic ovarian syndrome)    [2]   Past Surgical History:  Procedure Laterality Date    OTHER SURGICAL HISTORY  01/28/2019    Tonsillectomy with adenoidectomy    OTHER SURGICAL HISTORY  01/28/2019    Ear pressure equalization tube insertion   [3]   Family History  Problem Relation Name Age of Onset    Migraines Sister Sister     Deep vein thrombosis Father's Brother      Diabetes Maternal Grandfather Grandpa     Stroke Maternal Grandfather Grandpa     Dementia Maternal Grandfather Grandpa     Cataracts Maternal Grandfather Grandpa     Macular degeneration Maternal Grandfather Grandpa     Alzheimer's disease Maternal Grandfather Grandpa     Cancer Paternal Grandfather Grandpa    [4]   Current Outpatient Medications   Medication Sig Dispense Refill    cetirizine (ZyrTEC) 10 mg tablet Take 1 tablet (10 mg) by mouth once  daily. 90 tablet 3    ferrous sulfate (IRON ORAL) Take by mouth.      omeprazole (PriLOSEC) 40 mg DR capsule Take 1 capsule (40 mg) by mouth once daily. Take 1 capsule (40 mg) by mouth once daily. 90 capsule 3    sertraline (Zoloft) 100 mg tablet Take 1 tablet (100 mg) by mouth once daily. 90 tablet 3    traZODone (Desyrel) 50 mg tablet 1-2 tablets 30 minutes before bedtime as needed 90 tablet 2     No current facility-administered medications for this visit.

## 2025-07-25 ENCOUNTER — APPOINTMENT (OUTPATIENT)
Dept: GASTROENTEROLOGY | Facility: CLINIC | Age: 30
End: 2025-07-25
Payer: COMMERCIAL

## 2025-07-25 VITALS
RESPIRATION RATE: 16 BRPM | SYSTOLIC BLOOD PRESSURE: 111 MMHG | HEIGHT: 63 IN | OXYGEN SATURATION: 97 % | WEIGHT: 221.4 LBS | DIASTOLIC BLOOD PRESSURE: 77 MMHG | TEMPERATURE: 97.3 F | BODY MASS INDEX: 39.23 KG/M2 | HEART RATE: 85 BPM

## 2025-07-25 DIAGNOSIS — R11.2 NAUSEA AND VOMITING, UNSPECIFIED VOMITING TYPE: ICD-10-CM

## 2025-07-25 DIAGNOSIS — R63.4 UNINTENTIONAL WEIGHT LOSS: Primary | ICD-10-CM

## 2025-07-25 PROCEDURE — 99204 OFFICE O/P NEW MOD 45 MIN: CPT

## 2025-07-25 PROCEDURE — 3008F BODY MASS INDEX DOCD: CPT

## 2025-07-25 RX ORDER — FAMOTIDINE 20 MG/1
20 TABLET, FILM COATED ORAL 2 TIMES DAILY
Qty: 180 TABLET | Refills: 3 | Status: SHIPPED | OUTPATIENT
Start: 2025-07-25 | End: 2026-07-25

## 2025-07-25 ASSESSMENT — COLUMBIA-SUICIDE SEVERITY RATING SCALE - C-SSRS
6. HAVE YOU EVER DONE ANYTHING, STARTED TO DO ANYTHING, OR PREPARED TO DO ANYTHING TO END YOUR LIFE?: NO
2. HAVE YOU ACTUALLY HAD ANY THOUGHTS OF KILLING YOURSELF?: NO
1. IN THE PAST MONTH, HAVE YOU WISHED YOU WERE DEAD OR WISHED YOU COULD GO TO SLEEP AND NOT WAKE UP?: NO

## 2025-07-25 ASSESSMENT — ENCOUNTER SYMPTOMS
APPETITE CHANGE: 1
UNEXPECTED WEIGHT CHANGE: 1
NAUSEA: 1
VOMITING: 1
ROS GI COMMENTS: SEE HPI

## 2025-07-25 ASSESSMENT — PAIN SCALES - GENERAL: PAINLEVEL_OUTOF10: 0-NO PAIN

## 2025-07-25 NOTE — PATIENT INSTRUCTIONS
Schedule EGD/Colonoscopy  Retime your omeprazole to once daily in the morning on an empty stomach 30 mins before breakfast   Start famotidine twice daily   Hold omeprazole, Wonder Belly, Pepto 2 weeks before procedures  Please schedule appointment with registered dietitian  Follow up in 3 months

## 2025-07-30 ENCOUNTER — ANESTHESIA EVENT (OUTPATIENT)
Dept: GASTROENTEROLOGY | Facility: HOSPITAL | Age: 30
End: 2025-07-30
Payer: COMMERCIAL

## 2025-07-30 ENCOUNTER — PRE-ADMISSION TESTING (OUTPATIENT)
Dept: PREADMISSION TESTING | Facility: HOSPITAL | Age: 30
End: 2025-07-30
Payer: COMMERCIAL

## 2025-07-30 SDOH — HEALTH STABILITY: MENTAL HEALTH: CURRENT SMOKER: 0

## 2025-07-30 ASSESSMENT — DUKE ACTIVITY SCORE INDEX (DASI)
CAN YOU PARTICIPATE IN STRENOUS SPORTS LIKE SWIMMING, SINGLES TENNIS, FOOTBALL, BASKETBALL, OR SKIING: YES
CAN YOU HAVE SEXUAL RELATIONS: YES
CAN YOU CLIMB A FLIGHT OF STAIRS OR WALK UP A HILL: YES
CAN YOU TAKE CARE OF YOURSELF (EAT, DRESS, BATHE, OR USE TOILET): YES
CAN YOU RUN A SHORT DISTANCE: YES
DASI METS SCORE: 9.9
CAN YOU DO HEAVY WORK AROUND THE HOUSE LIKE SCRUBBING FLOORS OR LIFTING AND MOVING HEAVY FURNITURE: YES
CAN YOU PARTICIPATE IN MODERATE RECREATIONAL ACTIVITIES LIKE GOLF, BOWLING, DANCING, DOUBLES TENNIS OR THROWING A BASEBALL OR FOOTBALL: YES
CAN YOU WALK A BLOCK OR TWO ON LEVEL GROUND: YES
TOTAL_SCORE: 58.2
CAN YOU DO MODERATE WORK AROUND THE HOUSE LIKE VACUUMING, SWEEPING FLOORS OR CARRYING GROCERIES: YES
CAN YOU DO YARD WORK LIKE RAKING LEAVES, WEEDING OR PUSHING A MOWER: YES
CAN YOU DO LIGHT WORK AROUND THE HOUSE LIKE DUSTING OR WASHING DISHES: YES
CAN YOU WALK INDOORS, SUCH AS AROUND YOUR HOUSE: YES

## 2025-07-30 NOTE — PREPROCEDURE INSTRUCTIONS
Medication List            Accurate as of July 30, 2025  8:48 AM. Always use your most recent med list.                cetirizine 10 mg tablet  Commonly known as: ZyrTEC  Take 1 tablet (10 mg) by mouth once daily.  Medication Adjustments for Surgery: Do Not take on the morning of surgery     famotidine 20 mg tablet  Commonly known as: Pepcid  Take 1 tablet (20 mg) by mouth 2 times a day.  Medication Adjustments for Surgery: Do Not take on the morning of surgery     IRON ORAL  Additional Medication Adjustments for Surgery: Take last dose 7 days before surgery     omeprazole 40 mg DR capsule  Commonly known as: PriLOSEC  Take 1 capsule (40 mg) by mouth once daily. Take 1 capsule (40 mg) by mouth once daily.  Notes to patient: Hold for 2 weeks prior to your procedure     sertraline 100 mg tablet  Commonly known as: Zoloft  Take 1 tablet (100 mg) by mouth once daily.  Medication Adjustments for Surgery: Take/Use as prescribed     traZODone 50 mg tablet  Commonly known as: Desyrel  1-2 tablets 30 minutes before bedtime as needed  Medication Adjustments for Surgery: Take/Use as prescribed          NPO Instructions:     You may have clear liquids up until THREE hours prior to your arrival time.  Please follow all colonoscopy prep instructions. NO SOLID FOODS on 08/13/2024.  You may consume clear liquids only.  No cream/dairy products, no pulp, no red or purple dye. Follow enclosed prep instructions.        Additional Instructions:      Please stop your medications as directed.  [facility] will notify you the day prior to your procedure for your scheduled arrival time.  Please ensure that you have a responsible adult for transportation on the day of your procedure, as you will not be eligible for your procedure without appropriate transportation. Please follow all pre-operative instructions and call [facility] at [phone] should any questions  arise.  Please also notify the Outpatient Surgery Department if there are any changes in your health between now and your scheduled procedure.  Report to the second floor of [facility] upon arrival.  [facility] is located at [address] [address]. Thank you and we look forward to caring for you.            Colonoscopy Preparation Instructions          Prior to your procedure, purchase the following from your local grocery store or pharmacy.  A prescription is not needed.     Miralax (Glycolax) 8.3 oz. aka Polyethylene Glycol Powder   (238 gram bottle)     Bisacodyl (Dulcolax laxative) 5 mg   Tablets, NOT suppositories     Quantity 2 - 32 ounce bottles of Gatorade, Powerade or any non-carbonated clear liquid.  (Non-carbonated for miralax mixture only.   Pt may have carbonated drinks before or after drinking miralax prep solution).      ON THE DAY BEFORE YOUR PROCEDURE:           08/13/2025    Prepare colonoscopy prep mixture and refrigerate.      Take entire bottle of Miralax and mix with 64 ounces of Gatorade, Powerade or non-carbonated clear liquid.         The surgery scheduling department will call you with your arrival time.  Please disregard any automated calls or messages generated by the automated system on My Chart.        CLEAR LIQUID DIET THROUGHOUT THE DAY.       No products that contain milk or non-dairy creamer.       Nothing red or purple in color.     At 5:00 pm begin first half of prep     Drink half (32 oz) of miralax solution at a rate of 8 ounces (1 cup) every 15 minutes.     Take two (2) Dulcolax tablets     Continue clear liquids until bedtime          MORNING OF YOUR PROCEDURE:  08/14/2025     Five (5) hours prior to arrival time     Drink second half (32 oz) of miralax solution at a rate of 8 ounces (1 cup) every 15 minutes.     Take two (2) Dulcolax tablets     You may continue to drink clear liquids up to three (3) hours prior to arrival time.        If liquids, bubble gum, hard candy, mints or tobacco products are consumed within three (3) hours of procedure, the procedure will be canceled or postponed.          CLEAR LIQUID DIET CONSISTS OF:      Gatorade or Powerade     Broth or bouillon - chicken or beef (no meat or vegetable fibers)      Coffee or tea (no milk or non-dairy creamer)     Soda like ginger ale, 7-up or sprite     Apple juice, white grape juice with no pulp     Florentin-Aid or crystal light     Jell-O, Popsicles or Italian Ice (no red or purple)

## 2025-08-14 ENCOUNTER — ANESTHESIA (OUTPATIENT)
Dept: GASTROENTEROLOGY | Facility: HOSPITAL | Age: 30
End: 2025-08-14
Payer: COMMERCIAL

## 2025-08-14 ENCOUNTER — HOSPITAL ENCOUNTER (OUTPATIENT)
Dept: GASTROENTEROLOGY | Facility: HOSPITAL | Age: 30
Discharge: HOME | End: 2025-08-14
Payer: COMMERCIAL

## 2025-08-14 VITALS
HEIGHT: 63 IN | OXYGEN SATURATION: 100 % | BODY MASS INDEX: 39.87 KG/M2 | RESPIRATION RATE: 18 BRPM | DIASTOLIC BLOOD PRESSURE: 69 MMHG | HEART RATE: 90 BPM | WEIGHT: 225 LBS | SYSTOLIC BLOOD PRESSURE: 90 MMHG | TEMPERATURE: 98.1 F

## 2025-08-14 DIAGNOSIS — R63.4 UNINTENTIONAL WEIGHT LOSS: ICD-10-CM

## 2025-08-14 LAB — HCG UR QL IA.RAPID: NEGATIVE

## 2025-08-14 PROCEDURE — 3700000002 HC GENERAL ANESTHESIA TIME - EACH INCREMENTAL 1 MINUTE

## 2025-08-14 PROCEDURE — 43239 EGD BIOPSY SINGLE/MULTIPLE: CPT | Performed by: SURGERY

## 2025-08-14 PROCEDURE — 7100000009 HC PHASE TWO TIME - INITIAL BASE CHARGE

## 2025-08-14 PROCEDURE — 45378 DIAGNOSTIC COLONOSCOPY: CPT | Performed by: SURGERY

## 2025-08-14 PROCEDURE — 3700000001 HC GENERAL ANESTHESIA TIME - INITIAL BASE CHARGE

## 2025-08-14 PROCEDURE — 2500000004 HC RX 250 GENERAL PHARMACY W/ HCPCS (ALT 636 FOR OP/ED): Performed by: NURSE ANESTHETIST, CERTIFIED REGISTERED

## 2025-08-14 PROCEDURE — 81025 URINE PREGNANCY TEST: CPT

## 2025-08-14 PROCEDURE — 7100000010 HC PHASE TWO TIME - EACH INCREMENTAL 1 MINUTE

## 2025-08-14 RX ORDER — PROPOFOL 10 MG/ML
INJECTION, EMULSION INTRAVENOUS AS NEEDED
Status: DISCONTINUED | OUTPATIENT
Start: 2025-08-14 | End: 2025-08-14

## 2025-08-14 RX ADMIN — PROPOFOL 20 MG: 10 INJECTION, EMULSION INTRAVENOUS at 10:59

## 2025-08-14 RX ADMIN — PROPOFOL 30 MG: 10 INJECTION, EMULSION INTRAVENOUS at 10:43

## 2025-08-14 RX ADMIN — PROPOFOL 20 MG: 10 INJECTION, EMULSION INTRAVENOUS at 10:58

## 2025-08-14 RX ADMIN — PROPOFOL 20 MG: 10 INJECTION, EMULSION INTRAVENOUS at 10:54

## 2025-08-14 RX ADMIN — PROPOFOL 20 MG: 10 INJECTION, EMULSION INTRAVENOUS at 10:46

## 2025-08-14 RX ADMIN — PROPOFOL 20 MG: 10 INJECTION, EMULSION INTRAVENOUS at 10:40

## 2025-08-14 RX ADMIN — PROPOFOL 20 MG: 10 INJECTION, EMULSION INTRAVENOUS at 10:52

## 2025-08-14 RX ADMIN — PROPOFOL 30 MG: 10 INJECTION, EMULSION INTRAVENOUS at 10:38

## 2025-08-14 RX ADMIN — PROPOFOL 30 MG: 10 INJECTION, EMULSION INTRAVENOUS at 10:49

## 2025-08-14 RX ADMIN — PROPOFOL 20 MG: 10 INJECTION, EMULSION INTRAVENOUS at 10:44

## 2025-08-14 RX ADMIN — PROPOFOL 30 MG: 10 INJECTION, EMULSION INTRAVENOUS at 10:53

## 2025-08-14 RX ADMIN — PROPOFOL 30 MG: 10 INJECTION, EMULSION INTRAVENOUS at 10:39

## 2025-08-14 RX ADMIN — PROPOFOL 30 MG: 10 INJECTION, EMULSION INTRAVENOUS at 10:36

## 2025-08-14 RX ADMIN — PROPOFOL 30 MG: 10 INJECTION, EMULSION INTRAVENOUS at 10:37

## 2025-08-14 RX ADMIN — PROPOFOL 20 MG: 10 INJECTION, EMULSION INTRAVENOUS at 10:47

## 2025-08-14 RX ADMIN — PROPOFOL 20 MG: 10 INJECTION, EMULSION INTRAVENOUS at 10:42

## 2025-08-14 RX ADMIN — PROPOFOL 120 MG: 10 INJECTION, EMULSION INTRAVENOUS at 10:35

## 2025-08-14 RX ADMIN — PROPOFOL 20 MG: 10 INJECTION, EMULSION INTRAVENOUS at 10:50

## 2025-08-14 RX ADMIN — PROPOFOL 20 MG: 10 INJECTION, EMULSION INTRAVENOUS at 10:41

## 2025-08-14 RX ADMIN — PROPOFOL 30 MG: 10 INJECTION, EMULSION INTRAVENOUS at 10:51

## 2025-08-14 RX ADMIN — PROPOFOL 20 MG: 10 INJECTION, EMULSION INTRAVENOUS at 10:48

## 2025-08-14 RX ADMIN — PROPOFOL 20 MG: 10 INJECTION, EMULSION INTRAVENOUS at 10:45

## 2025-08-14 ASSESSMENT — PAIN - FUNCTIONAL ASSESSMENT
PAIN_FUNCTIONAL_ASSESSMENT: 0-10

## 2025-08-14 ASSESSMENT — PAIN SCALES - GENERAL
PAIN_LEVEL: 0
PAINLEVEL_OUTOF10: 0 - NO PAIN

## 2025-08-20 LAB
LABORATORY COMMENT REPORT: NORMAL
PATH REPORT.FINAL DX SPEC: NORMAL
PATH REPORT.GROSS SPEC: NORMAL
PATH REPORT.RELEVANT HX SPEC: NORMAL
PATH REPORT.TOTAL CANCER: NORMAL

## 2025-09-22 ENCOUNTER — APPOINTMENT (OUTPATIENT)
Dept: OPHTHALMOLOGY | Facility: CLINIC | Age: 30
End: 2025-09-22
Payer: COMMERCIAL

## 2025-10-27 ENCOUNTER — APPOINTMENT (OUTPATIENT)
Dept: GASTROENTEROLOGY | Facility: CLINIC | Age: 30
End: 2025-10-27
Payer: COMMERCIAL

## 2026-07-13 ENCOUNTER — APPOINTMENT (OUTPATIENT)
Dept: PRIMARY CARE | Facility: CLINIC | Age: 31
End: 2026-07-13
Payer: COMMERCIAL